# Patient Record
Sex: MALE | Race: WHITE | NOT HISPANIC OR LATINO | Employment: FULL TIME | URBAN - METROPOLITAN AREA
[De-identification: names, ages, dates, MRNs, and addresses within clinical notes are randomized per-mention and may not be internally consistent; named-entity substitution may affect disease eponyms.]

---

## 2017-05-09 ENCOUNTER — OFFICE VISIT - RIVER FALLS (OUTPATIENT)
Dept: FAMILY MEDICINE | Facility: CLINIC | Age: 47
End: 2017-05-09

## 2017-05-09 ASSESSMENT — MIFFLIN-ST. JEOR: SCORE: 2103.64

## 2017-09-06 ENCOUNTER — OFFICE VISIT - RIVER FALLS (OUTPATIENT)
Dept: FAMILY MEDICINE | Facility: CLINIC | Age: 47
End: 2017-09-06

## 2017-09-06 ASSESSMENT — MIFFLIN-ST. JEOR: SCORE: 2096.38

## 2017-09-07 LAB
CHOLEST SERPL-MCNC: 216 MG/DL
CHOLEST/HDLC SERPL: 5.7 {RATIO}
CREAT SERPL-MCNC: 1.02 MG/DL (ref 0.6–1.35)
GLUCOSE BLD-MCNC: 111 MG/DL (ref 65–99)
HBA1C MFR BLD: 5.9 %
HDLC SERPL-MCNC: 38 MG/DL
LDLC SERPL CALC-MCNC: 133 MG/DL
NONHDLC SERPL-MCNC: 178 MG/DL
TRIGL SERPL-MCNC: 315 MG/DL

## 2018-03-08 ENCOUNTER — OFFICE VISIT - RIVER FALLS (OUTPATIENT)
Dept: FAMILY MEDICINE | Facility: CLINIC | Age: 48
End: 2018-03-08

## 2018-03-08 ASSESSMENT — MIFFLIN-ST. JEOR: SCORE: 2103.64

## 2018-10-05 ENCOUNTER — OFFICE VISIT - RIVER FALLS (OUTPATIENT)
Dept: FAMILY MEDICINE | Facility: CLINIC | Age: 48
End: 2018-10-05

## 2018-10-05 ASSESSMENT — MIFFLIN-ST. JEOR: SCORE: 2009.29

## 2018-10-06 LAB
CHOLEST SERPL-MCNC: 243 MG/DL
CHOLEST/HDLC SERPL: 8.7 {RATIO}
CREAT SERPL-MCNC: 0.96 MG/DL (ref 0.6–1.35)
GLUCOSE BLD-MCNC: 104 MG/DL (ref 65–99)
HBA1C MFR BLD: 5.6 %
HDLC SERPL-MCNC: 28 MG/DL
LDLC SERPL CALC-MCNC: 141 MG/DL
LDLC SERPL CALC-MCNC: ABNORMAL MG/DL
NONHDLC SERPL-MCNC: 215 MG/DL
TRIGL SERPL-MCNC: 590 MG/DL

## 2018-11-06 ENCOUNTER — OFFICE VISIT - RIVER FALLS (OUTPATIENT)
Dept: FAMILY MEDICINE | Facility: CLINIC | Age: 48
End: 2018-11-06

## 2020-01-08 ENCOUNTER — OFFICE VISIT - RIVER FALLS (OUTPATIENT)
Dept: FAMILY MEDICINE | Facility: CLINIC | Age: 50
End: 2020-01-08

## 2020-01-08 ASSESSMENT — MIFFLIN-ST. JEOR: SCORE: 2008.38

## 2020-01-09 ENCOUNTER — COMMUNICATION - RIVER FALLS (OUTPATIENT)
Dept: FAMILY MEDICINE | Facility: CLINIC | Age: 50
End: 2020-01-09

## 2020-01-09 LAB
BUN SERPL-MCNC: 16 MG/DL (ref 7–25)
BUN/CREAT RATIO - HISTORICAL: NORMAL (ref 6–22)
CALCIUM SERPL-MCNC: 10.2 MG/DL (ref 8.6–10.3)
CHLORIDE BLD-SCNC: 102 MMOL/L (ref 98–110)
CHOLEST SERPL-MCNC: 242 MG/DL
CHOLEST/HDLC SERPL: 7.1 {RATIO}
CO2 SERPL-SCNC: 26 MMOL/L (ref 20–32)
CREAT SERPL-MCNC: 1.12 MG/DL (ref 0.7–1.33)
EGFRCR SERPLBLD CKD-EPI 2021: 76 ML/MIN/1.73M2
GLUCOSE BLD-MCNC: 89 MG/DL (ref 65–139)
HBA1C MFR BLD: 5.9 %
HDLC SERPL-MCNC: 34 MG/DL
LDLC SERPL CALC-MCNC: ABNORMAL MG/DL
NONHDLC SERPL-MCNC: 208 MG/DL
POTASSIUM BLD-SCNC: 4.2 MMOL/L (ref 3.5–5.3)
SODIUM SERPL-SCNC: 137 MMOL/L (ref 135–146)
TRIGL SERPL-MCNC: 465 MG/DL

## 2020-01-20 ENCOUNTER — COMMUNICATION - RIVER FALLS (OUTPATIENT)
Dept: FAMILY MEDICINE | Facility: CLINIC | Age: 50
End: 2020-01-20

## 2020-01-20 LAB — COLOGUARD-ABSTRACT: POSITIVE

## 2020-02-25 ENCOUNTER — OFFICE VISIT - RIVER FALLS (OUTPATIENT)
Dept: FAMILY MEDICINE | Facility: CLINIC | Age: 50
End: 2020-02-25

## 2020-10-20 ENCOUNTER — OFFICE VISIT - RIVER FALLS (OUTPATIENT)
Dept: FAMILY MEDICINE | Facility: CLINIC | Age: 50
End: 2020-10-20

## 2020-10-20 ASSESSMENT — MIFFLIN-ST. JEOR: SCORE: 2020.17

## 2020-11-06 ENCOUNTER — AMBULATORY - RIVER FALLS (OUTPATIENT)
Dept: FAMILY MEDICINE | Facility: CLINIC | Age: 50
End: 2020-11-06

## 2020-11-07 LAB
BUN SERPL-MCNC: 14 MG/DL (ref 7–25)
BUN/CREAT RATIO - HISTORICAL: ABNORMAL (ref 6–22)
CALCIUM SERPL-MCNC: 10.5 MG/DL (ref 8.6–10.3)
CHLORIDE BLD-SCNC: 107 MMOL/L (ref 98–110)
CHOLEST SERPL-MCNC: 160 MG/DL
CHOLEST/HDLC SERPL: 4.2 {RATIO}
CO2 SERPL-SCNC: 22 MMOL/L (ref 20–32)
CREAT SERPL-MCNC: 1.08 MG/DL (ref 0.7–1.33)
EGFRCR SERPLBLD CKD-EPI 2021: 80 ML/MIN/1.73M2
GLUCOSE BLD-MCNC: 101 MG/DL (ref 65–99)
HBA1C MFR BLD: 5.9 %
HDLC SERPL-MCNC: 38 MG/DL
LDLC SERPL CALC-MCNC: 97 MG/DL
NONHDLC SERPL-MCNC: 122 MG/DL
POTASSIUM BLD-SCNC: 4.5 MMOL/L (ref 3.5–5.3)
SODIUM SERPL-SCNC: 138 MMOL/L (ref 135–146)
TRIGL SERPL-MCNC: 148 MG/DL

## 2020-11-08 ENCOUNTER — COMMUNICATION - RIVER FALLS (OUTPATIENT)
Dept: FAMILY MEDICINE | Facility: CLINIC | Age: 50
End: 2020-11-08

## 2020-11-17 ENCOUNTER — OFFICE VISIT - RIVER FALLS (OUTPATIENT)
Dept: FAMILY MEDICINE | Facility: CLINIC | Age: 50
End: 2020-11-17

## 2020-12-01 ENCOUNTER — OFFICE VISIT - RIVER FALLS (OUTPATIENT)
Dept: FAMILY MEDICINE | Facility: CLINIC | Age: 50
End: 2020-12-01

## 2021-10-28 ENCOUNTER — OFFICE VISIT - RIVER FALLS (OUTPATIENT)
Dept: FAMILY MEDICINE | Facility: CLINIC | Age: 51
End: 2021-10-28

## 2021-10-28 ASSESSMENT — MIFFLIN-ST. JEOR: SCORE: 1967.56

## 2021-10-29 LAB
BUN SERPL-MCNC: 14 MG/DL (ref 7–25)
BUN/CREAT RATIO - HISTORICAL: NORMAL (ref 6–22)
CALCIUM SERPL-MCNC: 10 MG/DL (ref 8.6–10.3)
CHLORIDE BLD-SCNC: 104 MMOL/L (ref 98–110)
CHOLEST SERPL-MCNC: 133 MG/DL
CHOLEST/HDLC SERPL: 3.2 {RATIO}
CO2 SERPL-SCNC: 27 MMOL/L (ref 20–32)
CREAT SERPL-MCNC: 1.05 MG/DL (ref 0.7–1.33)
EGFRCR SERPLBLD CKD-EPI 2021: 82 ML/MIN/1.73M2
GLUCOSE BLD-MCNC: 79 MG/DL (ref 65–99)
HBA1C MFR BLD: 5.9 %
HDLC SERPL-MCNC: 41 MG/DL
LDLC SERPL CALC-MCNC: 68 MG/DL
NONHDLC SERPL-MCNC: 92 MG/DL
POTASSIUM BLD-SCNC: 4.3 MMOL/L (ref 3.5–5.3)
SODIUM SERPL-SCNC: 140 MMOL/L (ref 135–146)
TRIGL SERPL-MCNC: 160 MG/DL

## 2021-10-30 ENCOUNTER — COMMUNICATION - RIVER FALLS (OUTPATIENT)
Dept: FAMILY MEDICINE | Facility: CLINIC | Age: 51
End: 2021-10-30

## 2022-02-12 VITALS
SYSTOLIC BLOOD PRESSURE: 136 MMHG | HEART RATE: 68 BPM | TEMPERATURE: 97.8 F | DIASTOLIC BLOOD PRESSURE: 80 MMHG | WEIGHT: 254 LBS | RESPIRATION RATE: 16 BRPM | BODY MASS INDEX: 30.93 KG/M2 | OXYGEN SATURATION: 97 % | HEIGHT: 76 IN

## 2022-02-12 VITALS
BODY MASS INDEX: 28.69 KG/M2 | SYSTOLIC BLOOD PRESSURE: 138 MMHG | DIASTOLIC BLOOD PRESSURE: 68 MMHG | WEIGHT: 235.6 LBS | OXYGEN SATURATION: 97 % | SYSTOLIC BLOOD PRESSURE: 107 MMHG | HEART RATE: 54 BPM | DIASTOLIC BLOOD PRESSURE: 82 MMHG | HEIGHT: 76 IN | HEART RATE: 67 BPM | TEMPERATURE: 97.4 F

## 2022-02-12 VITALS
HEART RATE: 54 BPM | DIASTOLIC BLOOD PRESSURE: 67 MMHG | BODY MASS INDEX: 27.28 KG/M2 | HEIGHT: 76 IN | WEIGHT: 224 LBS | TEMPERATURE: 97.7 F | SYSTOLIC BLOOD PRESSURE: 118 MMHG

## 2022-02-12 VITALS
SYSTOLIC BLOOD PRESSURE: 137 MMHG | HEART RATE: 69 BPM | WEIGHT: 254 LBS | BODY MASS INDEX: 30.93 KG/M2 | HEIGHT: 76 IN | DIASTOLIC BLOOD PRESSURE: 85 MMHG

## 2022-02-12 VITALS
HEIGHT: 76 IN | SYSTOLIC BLOOD PRESSURE: 126 MMHG | TEMPERATURE: 97.7 F | DIASTOLIC BLOOD PRESSURE: 80 MMHG | HEART RATE: 64 BPM | WEIGHT: 252.4 LBS | BODY MASS INDEX: 30.74 KG/M2

## 2022-02-12 VITALS
SYSTOLIC BLOOD PRESSURE: 126 MMHG | TEMPERATURE: 97.4 F | WEIGHT: 233 LBS | DIASTOLIC BLOOD PRESSURE: 83 MMHG | HEART RATE: 71 BPM | HEIGHT: 76 IN | BODY MASS INDEX: 28.37 KG/M2

## 2022-02-12 VITALS
SYSTOLIC BLOOD PRESSURE: 96 MMHG | DIASTOLIC BLOOD PRESSURE: 62 MMHG | WEIGHT: 233.2 LBS | HEART RATE: 71 BPM | HEART RATE: 47 BPM | SYSTOLIC BLOOD PRESSURE: 120 MMHG | DIASTOLIC BLOOD PRESSURE: 70 MMHG | OXYGEN SATURATION: 97 % | TEMPERATURE: 98.3 F | HEIGHT: 76 IN | BODY MASS INDEX: 28.4 KG/M2 | TEMPERATURE: 97.1 F

## 2022-02-16 NOTE — NURSING NOTE
CAGE Assessment Entered On:  10/28/2021 3:58 PM CDT    Performed On:  10/28/2021 3:58 PM CDT by Yas Arce MA               Assessment   Have you ever felt you should cut down on your drinking :   No   Have people annoyed you by criticizing your drinking :   No   Have you ever felt bad or guilty about your drinking :   No   Have you ever taken a drink first thing in the morning to steady your nerves or get rid of a hangover (Eye-opener) :   No   CAGE Score :   0    Yas Arce MA - 10/28/2021 3:58 PM CDT

## 2022-02-16 NOTE — TELEPHONE ENCOUNTER
Entered by Cecile Ramos LPN on November 04, 2019 7:03:45 PM CST  ---------------------  From: Cecile Ramos LPN   To: [facility] #[id number]    Sent: 11/4/2019 7:03:45 PM CST  Subject: Medication Management     ** Submitted: **  Order:metoprolol (Metoprolol Tartrate 25 mg oral tablet)  1 tab(s)  Oral  bid  Qty:  60 tab(s)        Refills:  0          Substitutions Allowed     Route To [facility] - [facility] #[id number]    Signed by Cecile Ramos LPN  11/4/2019 7:03:00 PM    ** Submitted: **  Complete:metoprolol (Metoprolol Tartrate 25 mg oral tablet)   Signed by Cecile Ramos LPN  11/4/2019 7:03:00 PM    ** Not Approved:  **  metoprolol (METOPROLOL TARTRATE 25MG TABLETS)  TAKE 1 TABLET BY MOUTH TWICE DAILY  Qty:  180 tab(s)        Days Supply:  90        Refills:  0          Substitutions Allowed     Route To [facility] #[id number]   Signed by Cecile Ramos LPN          Med Refill      Date of last office visit and reason:  11/6/18 Atrial flutter      Date of last Med Check / Px:   10/5/18  Date of last labs pertaining to med:  10/5/18    RTC order in chart:  yes- reminder letter went out 10/8/19    For Protocol refill, has patient been contacted:  30 day supply sent, message sent to pharmacy        ------------------------------------------  From: [facility] #[id number]  To: Akin Castillo MD  Sent: November 4, 2019 1:22:25 PM CST  Subject: Medication Management  Due: November 5, 2019 1:22:25 PM CST    ** On Hold Pending Signature **  Drug: metoprolol (Metoprolol Tartrate 25 mg oral tablet)  1 TAB(S) ORAL BID  Quantity: 180 tab(s)  Days Supply: 0  Refills: 0  Substitutions Allowed  Notes from Pharmacy:     Dispensed Drug: metoprolol (Metoprolol Tartrate 25 mg oral tablet)  TAKE 1 TABLET BY MOUTH TWICE DAILY  Quantity: 180 tab(s)  Days Supply: 90  Refills: 0  Substitutions Allowed  Notes from Pharmacy:   ------------------------------------------

## 2022-02-16 NOTE — LETTER
(Inserted Image. Unable to display)   [address] [place]  [address]  October 30, 2021                      TOM DRIVER      [address] [place] [address]        Dear TOM,    Thank you for selecting [facility] for your health care needs.  Below you will find the results of your recent test(s) done at our clinic.     Your hemoglobin A1c is still a bit elevated.  It is in the prediabetic range.  Continue with a healthy diet, regular activity and maintain your current weight.      Result Name Current Result Previous Result Reference Range   Glucose Level (mg/dL)  79 10/28/2021 ((H)) 101 11/6/2020 65 - 99   BUN (mg/dL)  14 10/28/2021  14 11/6/2020 7 - 25   Creatinine Level (mg/dL)  1.05 10/28/2021  1.08 11/6/2020 0.70 - 1.33   eGFR (mL/min/1.73m2)  82 10/28/2021  80 11/6/2020 > OR = 60 -    eGFR  (mL/min/1.73m2)  95 10/28/2021  92 11/6/2020 > OR = 60 -    BUN/Creat Ratio  NOT APPLICABLE 10/28/2021  NOT APPLICABLE 11/6/2020 6 - 22   Sodium Level (mmol/L)  140 10/28/2021  138 11/6/2020 135 - 146   Potassium Level (mmol/L)  4.3 10/28/2021  4.5 11/6/2020 3.5 - 5.3   Chloride Level (mmol/L)  104 10/28/2021  107 11/6/2020 98 - 110   CO2 Level (mmol/L)  27 10/28/2021  22 11/6/2020 20 - 32   Calcium Level (mg/dL)  10.0 10/28/2021 ((H)) 10.5 11/6/2020 8.6 - 10.3   Cholesterol (mg/dL)  133 10/28/2021  160 11/6/2020  - <200   HDL (mg/dL)  41 10/28/2021 ((L)) 38 11/6/2020 > OR = 40 -    Triglyceride (mg/dL) ((H)) 160 10/28/2021  148 11/6/2020  - <150   LDL  68 10/28/2021  97 11/6/2020    Cholesterol/HDL Ratio  3.2 10/28/2021  4.2 11/6/2020  - <5.0   Non-HDL Cholesterol  92 10/28/2021  122 11/6/2020  - <130   Hgb A1c ((H)) 5.9 10/28/2021 ((H)) 5.9 11/6/2020  - <5.7       Please contact me or my assistant at [phone] if you have any questions about your results.    Sincerely,        Gage Quintana MD        What do your labs mean?  Below is a  glossary of commonly ordered labs:  LDL   Bad Cholesterol   HDL   Good Cholesterol  AST/ALT   Liver Function   Cr/Creatinine   Kidney Function  Microalbumin   Kidney Function  BUN   Kidney Function  PSA   Prostate    TSH   Thyroid Hormone  HgbA1c   Diabetes Test   Hgb (Hemoglobin)   Red Blood Cells

## 2022-02-16 NOTE — TELEPHONE ENCOUNTER
---------------------  From: Misty Cook   Sent: 11/10/2020 3:26:38 PM CST  Subject: Result: Esophagram     Spoke with patient at 3:25pm regarding recent Esophagram.  Per TFS esophagram shows changes from the fundoplication.  The endoscopy will get a better look at the area and will show any inflammation. Patient agrees and has no questions.  He is set up for the endoscopy with [facility].

## 2022-02-16 NOTE — TELEPHONE ENCOUNTER
---------------------  From: Ayala Cannon CMA   To: [facility] ([id number]_[place] [facility] [place]);     Sent: 1/20/2020 1:13:26 PM CST  Subject: Phone Message     Phone Message    PCP:   ANDREEA      Time of Call:  1305       Person Calling:  Angie from [employer]  Phone number:  [phone]    Note:   Angie called to make sure Dr Castillo saw the abnormal Cologuard result. Result scanned in patient chart.    Last office visit and reason:  01- well adult exam- male with TFSscanned in on 1/14/20---------------------  From: Tiffany Lundy CMA ([facility] ([id number]))   To: Akin Castillo MD;     Sent: 1/20/2020 2:12:07 PM CST  Subject: FW: Phone Message?   ?   ---------------------  From: Akin Castillo  To: Tiffany Lundy CMA ([facility] ([id number]))  Sent: January 20, 2020 4:47 PM CST  Subject: RE: Phone Message  ???  Let him know positive test so needs colonoscopy?Patient notified, colonoscopy order completed and given to referrals.

## 2022-02-16 NOTE — TELEPHONE ENCOUNTER
Order for repeat colonoscopy faxed to [facility] to schedule for end of year or early 2021.

## 2022-02-16 NOTE — LETTER
(Inserted Image. Unable to display)   November 08, 2020        TOM DRIVER      [address], [place] [id number]        Dear TOM,    Thank you for selecting [facility] for your health care needs.  Below you will find the results of your recent test(s) done at our clinic.     Your tests look good.  The HgbA1c is in the prediabetic range.  Weight management and diet changes are the best ways to control this.      Result Name Current Result Previous Result Reference Range   Sodium Level (mmol/L)  138 11/6/2020  137 1/8/2020 135 - 146   Potassium Level (mmol/L)  4.5 11/6/2020  4.2 1/8/2020 3.5 - 5.3   Chloride Level (mmol/L)  107 11/6/2020  102 1/8/2020 98 - 110   CO2 Level (mmol/L)  22 11/6/2020  26 1/8/2020 20 - 32   Glucose Level (mg/dL) ((H)) 101 11/6/2020  89 1/8/2020 65 - 99   BUN (mg/dL)  14 11/6/2020  16 1/8/2020 7 - 25   Creatinine Level (mg/dL)  1.08 11/6/2020  1.12 1/8/2020 0.70 - 1.33   eGFR (mL/min/1.73m2)  80 11/6/2020  76 1/8/2020 > OR = 60 -    Calcium Level (mg/dL) ((H)) 10.5 11/6/2020  10.2 1/8/2020 8.6 - 10.3   Hgb A1c ((H)) 5.9 11/6/2020 ((H)) 5.9 1/8/2020  - <5.7   Cholesterol (mg/dL)  160 11/6/2020 ((H)) 242 1/8/2020  - <200   Non-HDL Cholesterol  122 11/6/2020 ((H)) 208 1/8/2020  - <130   HDL (mg/dL) ((L)) 38 11/6/2020 ((L)) 34 1/8/2020 > OR = 40 -    Cholesterol/HDL Ratio  4.2 11/6/2020 ((H)) 7.1 1/8/2020  - <5.0   LDL  97 11/6/2020  See comment 1/8/2020    Triglyceride (mg/dL)  148 11/6/2020 ((H)) 465 1/8/2020  - <150       Please contact me or my assistant at [phone] if you have any questions about your results.    Sincerely,        Gage Quintana MD        What do your labs mean?  Below is a glossary of commonly ordered labs:  LDL   Bad Cholesterol   HDL   Good Cholesterol  AST/ALT   Liver Function   Cr/Creatinine   Kidney Function  Microalbumin   Kidney Function  BUN   Kidney Function  PSA   Prostate    TSH   Thyroid Hormone  HgbA1c   Diabetes Test    Hgb (Hemoglobin)   Red Blood Cells

## 2022-02-16 NOTE — PROGRESS NOTES
Patient:   TOM DRIVER            MRN: [id number]            FIN: [id number]               Age:   47 years     Sex:  Male     :  1970   Associated Diagnoses:   Well adult; Benign essential HTN; Mixed hyperlipidemia; Pre-diabetes; Psoriasis   Author:   Akin Castillo MD      Visit Information      Date of Service: 10/05/2018 07:58 am  Performing Location: [facility]  Encounter#: [id number]      Primary Care Provider (PCP):  Zoran Quintana MD    NPI# [id number]      Referring Provider:  Akin Castillo MD    NPI# [id number]   Visit type:  Annual exam.    Accompanied by:  No one.    Source of history:  Self, Medical record.    History limitation:  None.       Chief Complaint   10/5/2018 8:01 AM CDT    Px        Well Adult History   Well Adult History             The patient presents for well adult exam.  The patient's general health status is described as good.  The patient's diet is described as balanced and gluten free.  Exercise: occasional.  Associated symptoms consist of none.  Additional pertinent history: daily caffeine use, tobacco use none and occasional alcohol use.       colonoscopy:  n/a  lipid and diabetes screening:  due  prostate cancer screening:  n/a  heart disease risk:  moderate  immunizations:   influenza, obtained at work  other:  Doing well with metoprolol, BP under control,   2 kids (20,17)  RN Surgical sup. at [employer]      Review of Systems   Constitutional:  No fever, No chills, No sweats, No weakness, No fatigue.    Eye:  No recent visual problem.    Ear/Nose/Mouth/Throat:  No decreased hearing, No nasal congestion, No sore throat.    Respiratory:  No shortness of breath, No cough.    Cardiovascular:  Negative, No chest pain, No palpitations, No peripheral edema.    Gastrointestinal:  No nausea, No vomiting, No diarrhea, No constipation, No heartburn.    Genitourinary:  No dysuria, No change in urine stream.    Hematology/Lymphatics:  No bruising  tendency, No bleeding tendency.    Endocrine:  No cold intolerance, No heat intolerance.    Immunologic:  Negative.    Musculoskeletal:  Muscle pain, No back pain, No neck pain, No joint pain.    Integumentary:  No rash, No dryness, No skin lesion.    Neurologic:  Alert and oriented X4, No headache.    Psychiatric:  No anxiety, No depression.      PHQ9 and CAGE reviewed and discussed with patient.       Health Status   Allergies:    Allergic Reactions (Selected)  No Known Medication Allergies   Medications:  (Selected)   Documented Medications  Documented  Otezla 30 mg oral tablet: = 1 tab(s) ( 30 mg ), Oral, bid, Instructions: Rx'd by Dermatology, 0 Refill(s), Type: Maintenance,    Medications          *denotes recorded medication          *Otezla 30 mg oral tablet: 30 mg, 1 tab(s), Oral, bid, Rx'd by Dermatology, 0 Refill(s).     Problem list:    All Problems  Achalasia, esophageal / SNOMED CT 70958876 / Confirmed  Benign essential HTN / SNOMED CT 1209226 / Confirmed  Migraine headache / SNOMED CT 63719946 / Confirmed  Mixed hyperlipidemia / SNOMED CT 585362280 / Confirmed  Obesity / SNOMED CT 5106433762 / Probable  Pre-diabetes / SNOMED CT 4938654287 / Confirmed  Tobacco abuse / ICD-9-.1 / Confirmed      Histories   Past Medical History:    Active  Obesity (1556166250)  Tobacco abuse (305.1)  Migraine headache (74803996)  Achalasia, esophageal (81715578)   Family History:    Diabetes mellitus  Sister (Kelly)     Procedure history:    Vasectomy (SNOMED CT 92924829) performed by Zoran Quintana MD on 12/17/2009 at 39 Years.  Upper endoscopy with balloon dilation of esophagus and distal esophageal biopsies performed by Richard Jesus MD on 12/15/2009 at 39 Years.  heller myotomy w/ Zaki in 1986 at 16 Years.   Social History:        Alcohol Assessment            Current, 1-2 times per week, 3 drinks/episode average.      Tobacco Assessment            Past, Cigarettes, 19 year(s).      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      Home and Environment Assessment            Marital status: .  Spouse/Partner name: Spouse:   Xiomara.  Lives with Spouse.      Exercise and Physical Activity Assessment            Exercise frequency: 3-4 times/week.  Exercise type: Bicycling, Running, Walking.      Sexual Assessment            Sexually active: Yes.  Sexual orientation: Heterosexual.  Other contraceptive use: Hx of Vasectomy.        Physical Examination   Vital Signs   10/5/2018 8:01 AM CDT Temperature Tympanic 97.1 DegF  LOW    Peripheral Pulse Rate 71 bpm    HR Method Electronic    Systolic Blood Pressure 96 mmHg    Diastolic Blood Pressure 62 mmHg    Mean Arterial Pressure 73 mmHg    BP Method Electronic      Measurements from flowsheet : Measurements   10/5/2018 8:01 AM CDT Height Measured - Standard 76 in    Weight Measured - Standard 233.2 lb    BSA 2.38 m2    Body Mass Index 28.38 kg/m2  HI      General:  Alert and oriented, No acute distress.    Eye:  Pupils are equal, round and reactive to light, Extraocular movements are intact, Normal conjunctiva.    HENT:  Normocephalic, Tympanic membranes are clear, Oral mucosa is moist, No pharyngeal erythema, No sinus tenderness, posterior pharyngeal drainage.    Neck:  Supple, Non-tender, No carotid bruit, No lymphadenopathy, No thyromegaly.    Respiratory:  Lungs are clear to auscultation, Respirations are non-labored, Breath sounds are equal.         Chest wall: Wall tenderness ( Right, Anterior, Posterior, Mid-axillary ).    Cardiovascular:  Normal rate, Regular rhythm, No murmur, No gallop, Good pulses equal in all extremities, Normal peripheral perfusion, No edema.    Gastrointestinal:  Soft, Non-tender, Non-distended, Normal bowel sounds, No organomegaly.    Genitourinary:  No costovertebral angle tenderness.    Lymphatics:  WNL.     Musculoskeletal:  Normal range of motion, Normal strength, No tenderness, No swelling, No deformity.    Integumentary:  Warm, Dry, psoriasis.    Neurologic:  Alert, Oriented, Normal sensory, Normal motor function, No focal deficits.    Psychiatric:  Cooperative, Appropriate mood & affect.       Impression and Plan   Diagnosis     Well adult (NBN94-EL Z00.00).     Benign essential HTN (IKY84-AE I10).     Mixed hyperlipidemia (UBJ09-OD E78.2).     Pre-diabetes (ZOS43-ZH R73.03).     Psoriasis (INA36-QP L40.9).     Course:  Progressing as expected.    Plan:  Discussed health maintenance, diet, exercise, BMI discussed, labs today, continue current medication, tramadol for chest wall pain, Wt loss form psoriasis med   BP much better  dc metoprolol.    Orders     Orders (Selected)   Outpatient Orders  Ordered  Return to Clinic (Request): RFV: Yearly px with TFS, Return in 1 year  Ordered (In Transit)  Basic Metabolic Panel* ([facility]): Specimen Type: Serum, Collection Date: 10/05/18 7:01:00 CDT  Hemoglobin A1c* ([facility]): Specimen Type: Blood, Collection Date: 10/05/18 7:01:00 CDT  Lipid panel with reflex to direct ldl* ([facility]): Specimen Type: Serum, Collection Date: 10/05/18 7:01:00 CDT  Documented Medications  Documented  Otezla 30 mg oral tablet: = 1 tab(s) ( 30 mg ), Oral, bid, Instructions: Rx'd by Dermatology, 0 Refill(s), Type: Maintenance.

## 2022-02-16 NOTE — NURSING NOTE
Depression Screening Entered On:  10/20/2020 5:59 PM CDT    Performed On:  10/20/2020 5:59 PM CDT by Yas Arce MA               Depression Screening   Little Interest - Pleasure in Activities :   Not at all   Feeling Down, Depressed, Hopeless :   Not at all   Initial Depression Screen Score :   0 Score   Poor Appetite or Overeating :   Not at all   Trouble Falling or Staying Asleep :   Not at all   Feeling Tired or Little Energy :   Not at all   Feeling Bad About Yourself :   Not at all   Trouble Concentrating :   Not at all   Moving or Speaking Slowly :   Not at all   Thoughts Better Off Dead or Hurting Self :   Not at all   Detailed Depression Screen Score :   0    Total Depression Screen Score :   0    Yas Arce MA - 10/20/2020 5:59 PM CDT

## 2022-02-16 NOTE — NURSING NOTE
CAGE Assessment Entered On:  10/20/2020 5:59 PM CDT    Performed On:  10/20/2020 5:59 PM CDT by Yas Arce MA               Assessment   Have you ever felt you should cut down on your drinking :   No   Have people annoyed you by criticizing your drinking :   No   Have you ever felt bad or guilty about your drinking :   No   Have you ever taken a drink first thing in the morning to steady your nerves or get rid of a hangover (Eye-opener) :   No   CAGE Score :   0    Yas Arce MA - 10/20/2020 5:59 PM CDT

## 2022-02-16 NOTE — TELEPHONE ENCOUNTER
---------------------  From: Dodie Short   To: Lilian HALL, Ashley;     Sent: 12/1/2020 3:44:08 PM CST  Subject: Hearing Evaluation Results     Dr. Quintana,    I had the pleasure of seeing your patient for a hearing evaluation and his detailed results are listed below.    Hx: Patient reports bilateral tinnitus with decreased hearing. He has difficulty with conversation in noisy listening environments. Noise exposure from [employer] work. Denied pain, pressure, drainage, dizziness and family history of hearing loss.    Results: Otoscopy: clear canals bilaterally. Mild to moderate sensorineural hearing loss (SNHL) bilaterally.  Word Recognition Score: 88% left, 84% right    Rec: Bilateral hearing aid candidate. Annual hearing test or sooner if concerns arise.    Thank you for your referral and please let me know if any questions arise,    Sarah Aceves, CCC-A

## 2022-02-16 NOTE — PROGRESS NOTES
Patient:   TOM DRIVER            MRN: [id number]            FIN: [id number]               Age:   49 years     Sex:  Male     :  1970   Associated Diagnoses:   Well adult; Benign essential HTN; Mixed hyperlipidemia; Pre-diabetes; Psoriasis   Author:   Akin Castillo MD      Visit Information      Date of Service: 2020 04:11 pm  Performing Location: [facility]  Encounter#: [id number]      Primary Care Provider (PCP):  Zoran Quintana MD    NPI# [id number]      Referring Provider:  Akin Castillo MD    NPI# [id number]   Visit type:  Annual exam.    Accompanied by:  No one.    Source of history:  Self, Medical record.    History limitation:  None.       Chief Complaint   2020 4:14 PM CST     Px        Well Adult History   Well Adult History             The patient presents for well adult exam.  The patient's general health status is described as good.  The patient's diet is described as balanced and gluten free.  Exercise: occasional.  Associated symptoms consist of none.  Additional pertinent history: daily caffeine use, tobacco use none and occasional alcohol use.     psoriasis sees derm  No atrial flutter   2 kids (21,18)  RN Surgical sup. at [employer]      Review of Systems   Constitutional:  No fever, No chills, No sweats, No weakness, No fatigue.    Eye:  No recent visual problem.    Ear/Nose/Mouth/Throat:  No decreased hearing, No nasal congestion, No sore throat.    Respiratory:  No shortness of breath, No cough.    Cardiovascular:  Negative, No chest pain, No palpitations, No peripheral edema.    Gastrointestinal:  No nausea, No vomiting, No diarrhea, No constipation, No heartburn.    Genitourinary:  No dysuria, No change in urine stream.    Hematology/Lymphatics:  No bruising tendency, No bleeding tendency.    Endocrine:  No cold intolerance, No heat intolerance.    Immunologic:  Negative.    Musculoskeletal:  Muscle pain, No back pain, No neck pain, No  joint pain.    Integumentary:  No rash, No dryness, No skin lesion.    Neurologic:  Alert and oriented X4, No headache.    Psychiatric:  No anxiety, No depression.      PHQ9 and CAGE reviewed and discussed with patient.       Health Status   Allergies:    Allergic Reactions (Selected)  No Known Medication Allergies   Medications:  (Selected)   Prescriptions  Prescribed  Metoprolol Tartrate 25 mg oral tablet: = 1 tab(s), Oral, bid, # 60 tab(s), 0 Refill(s), Type: Maintenance, Pharmacy: [facility] #[id number], Needs appt for further refills  Documented Medications  Documented  Otezla 30 mg oral tablet: = 1 tab(s) ( 30 mg ), Oral, bid, Instructions: Rx'd by Dermatology, 0 Refill(s), Type: Maintenance,    Medications          *denotes recorded medication          *Otezla 30 mg oral tablet: 30 mg, 1 tab(s), Oral, bid, Rx'd by Dermatology, 0 Refill(s).          Metoprolol Tartrate 25 mg oral tablet: 1 tab(s), Oral, bid, 60 tab(s), 0 Refill(s).       Problem list:    All Problems  Achalasia, esophageal / SNOMED CT 90755078 / Confirmed  Benign essential HTN / SNOMED CT 9470546 / Confirmed  H/O vasectomy / SNOMED CT 126480165 / Confirmed  Migraine headache / SNOMED CT 24720361 / Confirmed  Mixed hyperlipidemia / SNOMED CT 808063091 / Confirmed  Obesity / SNOMED CT 1029982329 / Probable  Pre-diabetes / SNOMED CT 4773546366 / Confirmed  Tobacco abuse / ICD-9-.1 / Confirmed      Histories   Past Medical History:    Active  Obesity (9870962427)  Tobacco abuse (305.1)  Migraine headache (70249218)  Achalasia, esophageal (27171043)   Family History:    Diabetes mellitus  Sister (Kelly)     Procedure history:    Vasectomy (SNOMED CT 53130047) performed by Zoran Quintana MD on 12/17/2009 at 39 Years.  Upper endoscopy with balloon dilation of esophagus and distal esophageal biopsies performed by Richard Jesus MD on 12/15/2009 at 39 Years.  heller myotomy w/ Zaki in 1986 at 16 Years.   Social History:        Alcohol  Assessment            Current, 1-2 times per week, 3 drinks/episode average.      Tobacco Assessment            Past, Cigarettes, 19 year(s).      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      Home and Environment Assessment            Marital status: .  Spouse/Partner name: Spouse:   Xiomara.  Lives with Spouse.      Exercise and Physical Activity Assessment            Exercise frequency: 3-4 times/week.  Exercise type: Bicycling, Running, Walking.      Sexual Assessment            Sexually active: Yes.  Sexual orientation: Heterosexual.  Other contraceptive use: Hx of Vasectomy.        Physical Examination   Vital Signs   1/8/2020 4:14 PM CST Temperature Tympanic 97.4 DegF  LOW    Peripheral Pulse Rate 71 bpm    HR Method Electronic    Systolic Blood Pressure 126 mmHg    Diastolic Blood Pressure 83 mmHg  HI    Mean Arterial Pressure 97 mmHg    BP Method Electronic      Measurements from flowsheet : Measurements   1/8/2020 4:14 PM CST Height Measured - Standard 76 in    Weight Measured - Standard 233.0 lb    BSA 2.38 m2    Body Mass Index 28.36 kg/m2  HI      General:  Alert and oriented, No acute distress.    Eye:  Pupils are equal, round and reactive to light, Extraocular movements are intact, Normal conjunctiva.    HENT:  Normocephalic, Tympanic membranes are clear, Oral mucosa is moist, No pharyngeal erythema, No sinus tenderness, posterior pharyngeal drainage.    Neck:  Supple, Non-tender, No carotid bruit, No lymphadenopathy, No thyromegaly.    Respiratory:  Lungs are clear to auscultation, Respirations are non-labored, Breath sounds are equal.         Chest wall: Wall tenderness ( Right, Anterior, Posterior, Mid-axillary ).    Cardiovascular:  Normal rate, Regular rhythm, No murmur, No gallop, Good pulses equal in all extremities, Normal peripheral perfusion, No edema.     Gastrointestinal:  Soft, Non-tender, Non-distended, Normal bowel sounds, No organomegaly.    Genitourinary:  No costovertebral angle tenderness.    Lymphatics:  WNL.    Musculoskeletal:  Normal range of motion, Normal strength, No tenderness, No swelling, No deformity.    Integumentary:  Warm, Dry, psoriasis  plams.    Neurologic:  Alert, Oriented, Normal sensory, Normal motor function, No focal deficits.    Psychiatric:  Cooperative, Appropriate mood & affect.       Impression and Plan   Diagnosis     Well adult (NHN39-WZ Z00.00).     Benign essential HTN (TIG77-EZ I10).     Mixed hyperlipidemia (OKQ25-OT E78.2).     Pre-diabetes (CIV09-FA R73.03).     Psoriasis (VDY05-RK L40.9).     Course:  Progressing as expected.    Plan:  Discussed health maintenance, diet, exercise, BMI discussed, labs today, continue current medication,  wants to stop po psoriasis med  will trial; lachydrin  cologuard  discussed psa,   .    Patient Instructions:       Counseled: Patient, Regarding diagnosis, Regarding medications.    Orders     Orders (Selected)   Outpatient Orders  Ordered  Return to Clinic (Request): RFV: Yearly px with TFS, Return in 1 year  Ordered (Dispatched)  Basic Metabolic Panel* ([facility]): Specimen Type: Serum, Collection Date: 01/08/20 7:24:00 CST  Hemoglobin A1c* ([facility]): Specimen Type: Blood, Collection Date: 01/08/20 7:24:00 CST  Lipid panel with reflex to direct ldl* ([facility]): Specimen Type: Serum, Collection Date: 01/08/20 7:24:00 CST  Prescriptions  Prescribed  Metoprolol Tartrate 25 mg oral tablet: = 1 tab(s), Oral, bid, # 60 tab(s), 0 Refill(s), Type: Maintenance, Pharmacy: [facility] #[id number], Needs appt for further refills  Documented Medications  Documented  Otezla 30 mg oral tablet: = 1 tab(s) ( 30 mg ), Oral, bid, Instructions: Rx'd by Dermatology, 0 Refill(s), Type: Maintenance.

## 2022-02-16 NOTE — PROGRESS NOTES
Patient:   TOM DRIVER            MRN: [id number]            FIN: [id number]               Age:   47 years     Sex:  Male     :  1970   Associated Diagnoses:   None   Author:   Anna HALL, Akin      Procedure   EKG procedure   Indication: palpitations.     Position: supine.     EKG findings   Interpretation: by primary care provider.     Rhythm: sinus.     Axis: normal axis, normal configuration.     Within normal limits.     Intervals: LA normal, QRS normal, QT normal.     Normal EKG.     P waves: normal.     QRS complex: normal, no Q waves present.     ST-T-U complex: normal.     Interpretation: normal EKG, no ST-T wave abnormalities.     Discussed: with patient.        Impression and Plan   Orders

## 2022-02-16 NOTE — NURSING NOTE
Depression Screening Entered On:  10/28/2021 3:58 PM CDT    Performed On:  10/28/2021 3:58 PM CDT by Yas Arce MA               Depression Screening   Little Interest - Pleasure in Activities :   Not at all   Feeling Down, Depressed, Hopeless :   Not at all   Initial Depression Screen Score :   0 Score   Poor Appetite or Overeating :   Not at all   Trouble Falling or Staying Asleep :   Not at all   Feeling Tired or Little Energy :   Not at all   Feeling Bad About Yourself :   Not at all   Trouble Concentrating :   Not at all   Moving or Speaking Slowly :   Not at all   Thoughts Better Off Dead or Hurting Self :   Not at all   Detailed Depression Screen Score :   0    Total Depression Screen Score :   0    Yas Arce MA - 10/28/2021 3:58 PM CDT

## 2022-02-16 NOTE — TELEPHONE ENCOUNTER
Entered by Alysa Thompson on January 08, 2020 5:09:53 PM CST  done      ---------------------  From: Misty Cook   To: Appointment Pool ([id number]);     Sent: 1/8/2020 4:43:12 PM CST  Subject: General Message     please change pcp to TFS

## 2022-02-16 NOTE — NURSING NOTE
Comprehensive Intake Entered On:  1/8/2020 4:15 PM CST    Performed On:  1/8/2020 4:14 PM CST by Misty Cook               Summary   Chief Complaint :   Px   Advance Directive :   No   Weight Measured :   233.0 lb(Converted to: 233 lb 0 oz, 105.69 kg)    Height Measured :   76 in(Converted to: 6 ft 4 in, 193.04 cm)    Body Mass Index :   28.36 kg/m2 (HI)    Body Surface Area :   2.38 m2   Systolic Blood Pressure :   126 mmHg   Diastolic Blood Pressure :   83 mmHg (HI)    Mean Arterial Pressure :   97 mmHg   Peripheral Pulse Rate :   71 bpm   BP Method :   Electronic   HR Method :   Electronic   Temperature Tympanic :   97.4 DegF(Converted to: 36.3 DegC)  (LOW)    Misty Cook - 1/8/2020 4:14 PM CST   Health Status   Allergies Verified? :   Yes   Medication History Verified? :   Yes   Pre-Visit Planning Status :   Completed   Tobacco Use? :   Never smoker   Misty Cook - 1/8/2020 4:14 PM CST

## 2022-02-16 NOTE — LETTER
(Inserted Image. Unable to display)   October 22, 2020      TOM DRIVER  [address], [place] [id number]        Dear TOM,      Thank you for selecting [facility] for your healthcare needs.       Your recent chest x-ray was read as normal.          Please contact me or my assistant at [phone] if you have any questions or concerns.     Sincerely,        Zoran Quintana MD

## 2022-02-16 NOTE — LETTER
(Inserted Image. Unable to display)   October 21, 2021    TOM DRIVER  [address] [place] [address]            Dear TOM,      Thank you for selecting [facility] for your healthcare needs.    Our records indicate you are due for the following services:     Annual Physical.    (FYI   Regarding office visits: In some instances, a video visit or telephone visit may be offered as an option.)      To schedule an appointment or if you have further questions, please contact your clinic at [phone].      Powered by [employer]    Sincerely,    Akin Castillo MD

## 2022-02-16 NOTE — TELEPHONE ENCOUNTER
Entered by Nathaly Flaherty CMA on January 05, 2021 7:26:04 AM CST  ---------------------  From: Nathaly Flaherty CMA   To: [facility] #[id number]    Sent: 1/5/2021 7:26:04 AM CST  Subject: Medication Management     ** Submitted: **  Order:metoprolol (Metoprolol Tartrate 25 mg oral tablet)  1 tab(s)  Oral  bid  Qty:  180 tab(s)        Days Supply:  90        Refills:  2          Substitutions Allowed     Route To [facility] #[id number]    Signed by Nathaly Flaherty CMA  1/5/2021 1:25:00 PM [facility]    ** Submitted: **  Complete:metoprolol (Metoprolol Tartrate 25 mg oral tablet)   Signed by Nathaly Flaherty CMA  1/5/2021 1:26:00 PM [facility]    ** Not Approved:  **  metoprolol (METOPROLOL TARTRATE 25MG TABLETS)  TAKE 1 TABLET BY MOUTH TWICE DAILY  Qty:  180 tab(s)        Days Supply:  90        Refills:  0          Substitutions Allowed     Route To [facility] #[id number]   Signed by Nathaly Flaherty CMA            ------------------------------------------  From: [facility] #[id number]  To: Akin Castillo MD  Sent: January 2, 2021 12:47:21 PM CST  Subject: Medication Management  Due: December 24, 2020 1:58:33 PM CST     ** On Hold Pending Signature **     Dispensed Drug: metoprolol (Metoprolol Tartrate 25 mg oral tablet), TAKE 1 TABLET BY MOUTH TWICE DAILY  Quantity: 180 tab(s)  Days Supply: 90  Refills: 0  Substitutions Allowed  Notes from Pharmacy:  ------------------------------------------10/20/20 px, CDM  rtc 1 year

## 2022-02-16 NOTE — TELEPHONE ENCOUNTER
Entered by Yas Arce MA on October 18, 2021 9:58:33 AM CDT  ---------------------  From: Yas Arce MA   To: [facility] #[id number]    Sent: 10/18/2021 9:58:33 AM CDT  Subject: Medication Management     ** Not Approved: Patient needs appointment **  atorvastatin (ATORVASTATIN 20MG TABLETS)  TAKE 1 TABLET BY MOUTH DAILY  Qty:  90 tab(s)        Days Supply:  90        Refills:  0          Substitutions Allowed     Route To Pharmacy - [facility] #[id number]   Note from Pharmacy:  **Patient requests 90 days supply**  Signed by Yas Arce MA            ------------------------------------------  From: [facility] #[id number]  To: Zoran Quintana MD  Sent: October 12, 2021 12:49:13 PM CDT  Subject: Medication Management  Due: October 2, 2021 3:22:27 PM CDT     ** On Hold Pending Signature **     Dispensed Drug: atorvastatin (atorvastatin 20 mg oral tablet), TAKE 1 TABLET BY MOUTH DAILY  Quantity: 90 tab(s)  Days Supply: 90  Refills: 0  Substitutions Allowed  Notes from Pharmacy: **Patient requests 90 days supply**  ------------------------------------------

## 2022-02-16 NOTE — TELEPHONE ENCOUNTER
Entered by Nathaly Flaherty CMA on October 12, 2021 12:46:56 PM CDT  ---------------------  From: Nathaly Flaherty CMA   To: [facility] #[id number]    Sent: 10/12/2021 12:46:56 PM CDT  Subject: Medication Management     ** Submitted: **  Order:metoprolol (Metoprolol Tartrate 25 mg oral tablet)  1 tab(s)  Oral  bid  Qty:  60 tab(s)        Refills:  0          Substitutions Allowed     Route To [facility] #[id number]    Signed by Nathaly Flaherty CMA  10/12/2021 5:46:00 PM [facility]    ** Submitted: **  Complete:metoprolol (Metoprolol Tartrate 25 mg oral tablet)   Signed by Nathaly Flaherty CMA  10/12/2021 5:46:00 PM [facility]    ** Not Approved:  **  metoprolol (METOPROLOL TARTRATE 25MG TABLETS)  TAKE 1 TABLET BY MOUTH TWICE DAILY  Qty:  180 tab(s)        Days Supply:  90        Refills:  0          Substitutions Allowed     Route To [facility] #[id number]   Signed by Nathaly Flaherty CMA            ------------------------------------------  From: [facility] #[id number]  To: Akin Castillo MD  Sent: October 9, 2021 4:47:09 PM CDT  Subject: Medication Management  Due: October 2, 2021 3:22:27 PM CDT     ** On Hold Pending Signature **     Dispensed Drug: metoprolol (Metoprolol Tartrate 25 mg oral tablet), TAKE 1 TABLET BY MOUTH TWICE DAILY  Quantity: 180 tab(s)  Days Supply: 90  Refills: 0  Substitutions Allowed  Notes from Pharmacy:  ------------------------------------------due for px - reminder letter will go out

## 2022-02-16 NOTE — TELEPHONE ENCOUNTER
Order, notes and insurance card are faxed to [facility] and they will contact patient.

## 2022-02-16 NOTE — TELEPHONE ENCOUNTER
Entered by Yas Arce MA on December 08, 2021 9:17:50 AM CST  ---------------------  From: Yas Arce MA   To: [email or web address] #[email or web address]    Sent: 12/8/2021 9:17:50 AM CST  Subject: Medication Management     ** Not Approved: Refill not appropriate, was refilled by [facility] 10/28/2021 #90 w/ 1 refill **  metoprolol (METOPROLOL TARTRATE 25MG TABLETS)  TAKE 1 TABLET BY MOUTH TWICE DAILY  Qty:  180 tab(s)        Days Supply:  90        Refills:  0          Substitutions Allowed     Route To Pharmacy - [email or web address] #[id number]   Note from Pharmacy:  **Patient requests 90 days supply**  Signed by Yas Arce MA            ------------------------------------------  From: [email or web address] #[email or web address]  To: Akin Castillo MD  Sent: December 5, 2021 10:47:35 AM CST  Subject: Medication Management  Due: November 16, 2021 3:37:48 PM CST     ** On Hold Pending Signature **     Dispensed Drug: metoprolol (Metoprolol Tartrate 25 mg oral tablet), TAKE 1 TABLET BY MOUTH TWICE DAILY  Quantity: 180 tab(s)  Days Supply: 90  Refills: 0  Substitutions Allowed  Notes from Pharmacy: **Patient requests 90 days supply**  ------------------------------------------

## 2022-02-16 NOTE — LETTER
(Inserted Image. Unable to display)   November 20, 2020      TOM DRIVER  [address], [place] [id number]        Dear TOM,      Thank you for selecting [facility] for your healthcare needs.     You had a colonoscopy done for colon polyp surveillance on Tuesday November 17th, 2020. One precancerous polyp (tubular adenoma) and one nonprecancerous polyp (hyperplastic) were removed. I would recommend a follow up colonoscopy in 3 years and sooner if new symptoms develop.            Please contact me or my assistant at [phone] if you have any questions or concerns.     Sincerely,        Gage Pyle MD

## 2022-02-16 NOTE — TELEPHONE ENCOUNTER
Entered by Alysa Thompson on October 19, 2021 12:00:11 PM CDT  Patient will call back to schedule      ---------------------  From: Tamika Spencer CMA   To: Appointment Pool ([id number]);     Sent: 10/19/2021 8:04:31 AM CDT  Subject: annual exam due - please call pt to set up - thanks!

## 2022-02-16 NOTE — PROGRESS NOTES
Patient:   TOM DRIVER            MRN: [id number]            FIN: [id number]               Age:   46 years     Sex:  Male     :  1970   Associated Diagnoses:   Well adult; Benign essential HTN; Mixed hyperlipidemia; Pre-diabetes; Chest wall injury   Author:   Zoran Quintana MD      Visit Information      Date of Service: 2017 08:44 am  Performing Location: [facility]  Encounter#: [id number]      Primary Care Provider (PCP):  Zoran Quintana MD    NPI# [id number]   Visit type:  Annual exam.    Accompanied by:  No one.    Source of history:  Self, Medical record.    History limitation:  None.       Chief Complaint   2017 8:56 AM CDT     annual px, refill meds.   also c/o pulled muscles over rib area over the weekend--difficult to breath at times.   also c/o sore throat d3ela--uhxoql fever, body aches.        Well Adult History   Well Adult History             The patient presents for well adult exam.  The patient's general health status is described as good.  The patient's diet is described as balanced and gluten free.  Exercise: occasional.  Associated symptoms consist of none.  Additional pertinent history: daily caffeine use, tobacco use none and occasional alcohol use.       colonoscopy:  n/a  lipid and diabetes screening:  due  prostate cancer screening:  n/a  heart disease risk:  moderate  immunizations:  due for influenza, will obtain at work  other:  Doing well with metoprolol, BP under control, recent chest wall strain playing golf         Review of Systems   Constitutional:  No fever, No chills, No sweats, No weakness, No fatigue.    Eye:  No recent visual problem.    Ear/Nose/Mouth/Throat:  Sore throat, No decreased hearing, No nasal congestion.    Respiratory:  No shortness of breath, No cough.    Cardiovascular:  Negative, No chest pain, No palpitations, No peripheral edema.    Gastrointestinal:  No nausea, No vomiting, No diarrhea, No constipation, No heartburn.     Genitourinary:  No dysuria, No change in urine stream.    Hematology/Lymphatics:  No bruising tendency, No bleeding tendency.    Endocrine:  No cold intolerance, No heat intolerance.    Immunologic:  Negative.    Musculoskeletal:  Muscle pain, No back pain, No neck pain, No joint pain.    Integumentary:  No rash, No dryness, No skin lesion.    Neurologic:  Alert and oriented X4, No headache.    Psychiatric:  No anxiety, No depression.      PHQ9 and CAGE reviewed and discussed with patient.       Health Status   Allergies:    Allergic Reactions (Selected)  No Known Medication Allergies   Medications:  (Selected)   Prescriptions  Prescribed  Metoprolol Tartrate 25 mg oral tablet: See Instructions, Instructions: TAKE ONE TABLET BY MOUTH TWICE DAILY, # 60 tab(s), Type: Soft Stop, Pharmacy: [facility]  cetirizine 10 mg oral tablet: 1 tab(s) ( 10 mg ), po, daily, # 15 tab(s), 0 Refill(s), Type: Maintenance, Pharmacy: [facility], Patient contacted about protocol fill.   Problem list:    All Problems  Achalasia, Esophageal / ICD-9-.0 / Confirmed  Migraine Headache / ICD-9-.90 / Confirmed  Obesity / ICD-9-.00 / Probable  Tobacco abuse / ICD-9-.1 / Confirmed      Histories   Past Medical History:    Active  Tobacco abuse (305.1)  Migraine Headache (346.90)  Achalasia, Esophageal (530.0)   Family History:    Diabetes mellitus  Sister (Kelly)     Procedure history:    Vasectomy (SNOMED CT 33383850) on 12/17/2009 at 39 Years.  Upper endoscopy with balloon dilation of esophagus and distal esophageal biopsies on 12/15/2009 at 39 Years.  heller myotomy w/ Zaki in 1986 at 16 Years.   Social History:        Alcohol Assessment            Current, 1-2 times per week, 3 drinks/episode average.      Tobacco Assessment            Past, Cigarettes, 20 year(s).      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      Home and Environment Assessment            Marital status: .  Spouse/Partner name: Spouse:   Xiomara.  Lives with Spouse.      Exercise and Physical Activity Assessment            Exercise frequency: 3-4 times/week.  Exercise type: Bicycling, Running, Walking.      Sexual Assessment            Sexually active: Yes.  Sexual orientation: Heterosexual.  Other contraceptive use: Hx of Vasectomy.        Physical Examination   Vital Signs   9/6/2017 8:56 AM CDT Temperature Tympanic 97.7 DegF  LOW    Peripheral Pulse Rate 64 bpm    Pulse Site Radial artery    HR Method Manual    Systolic Blood Pressure 126 mmHg    Diastolic Blood Pressure 80 mmHg    Mean Arterial Pressure 95 mmHg    BP Site Right arm    BP Method Manual      Measurements from flowsheet : Measurements   9/6/2017 8:56 AM CDT Height Measured - Standard 76 in    Weight Measured - Standard 252.4 lb    BSA 2.48 m2    Body Mass Index 30.72 kg/m2      General:  Alert and oriented, No acute distress.    Eye:  Pupils are equal, round and reactive to light, Extraocular movements are intact, Normal conjunctiva.    HENT:  Normocephalic, Tympanic membranes are clear, Oral mucosa is moist, No pharyngeal erythema, No sinus tenderness, posterior pharyngeal drainage.    Neck:  Supple, Non-tender, No carotid bruit, No lymphadenopathy, No thyromegaly.    Respiratory:  Lungs are clear to auscultation, Respirations are non-labored, Breath sounds are equal.         Chest wall: Wall tenderness ( Right, Anterior, Posterior, Mid-axillary ).    Cardiovascular:  Normal rate, Regular rhythm, No murmur, No gallop, Good pulses equal in all extremities, Normal peripheral perfusion, No edema.    Gastrointestinal:  Soft, Non-tender, Non-distended, Normal bowel sounds, No organomegaly.    Genitourinary:  No costovertebral angle tenderness.    Lymphatics:  WNL.    Musculoskeletal:  Normal range of motion, Normal strength, No  tenderness, No swelling, No deformity.    Integumentary:  Warm, Dry, No rash.    Neurologic:  Alert, Oriented, Normal sensory, Normal motor function, No focal deficits.    Psychiatric:  Cooperative, Appropriate mood & affect.       Impression and Plan   Diagnosis     Well adult (XOI26-BP Z00.00).     Benign essential HTN (KZK09-ZW I10).     Chest wall injury (NTT34-TD S29.011A).     Mixed hyperlipidemia (PRW07-CV E78.2).     Pre-diabetes (CAL44-FE R73.03).     Course:  Progressing as expected.    Plan:  Discussed health maintenance, diet, exercise, BMI discussed, labs today, continue current medication, tramadol for chest wall pain.    Orders     Orders (Selected)   Outpatient Orders  Ordered (Dispatched)  Basic Metabolic Panel* ([facility]): Specimen Type: Serum, Collection Date: 09/06/17 9:26:00 CDT  Hemoglobin A1c* ([facility]): Specimen Type: Blood, Collection Date: 09/06/17 9:26:00 CDT  Lipid panel with reflex to direct ldl* ([facility]): Specimen Type: Serum, Collection Date: 09/06/17 9:26:00 CDT  Prescriptions  Prescribed  Metoprolol Tartrate 25 mg oral tablet: 1 tab(s) ( 25 mg ), po, bid, # 180 tab(s), 3 Refill(s), Type: Soft Stop, Pharmacy: [id number], 1 tab(s) po bid,x90 day(s)  cetirizine 10 mg oral tablet: 1 tab(s) ( 10 mg ), po, daily, # 90 tab(s), 3 Refill(s), Type: Maintenance, Pharmacy: [facility] [id number], Patient contacted about protocol fill., 1 tab(s) po daily,x90 day(s)  traMADol 50 mg oral tablet: 1 tab(s) ( 50 mg ), PO, q6-8 hrs, PRN: for pain, # 30 tab(s), 1 Refill(s), Type: Maintenance, Pharmacy: [facility] [id number], 1 tab(s) po q6-8 hrs,PRN:for pain.

## 2022-02-16 NOTE — PROGRESS NOTES
Patient:   TOM DRIVER            MRN: [id number]            FIN: [id number]               Age:   47 years     Sex:  Male     :  1970   Associated Diagnoses:   Atrial flutter   Author:   Akin Castillo MD      Visit Information      Date of Service: 2018 06:11 pm  Performing Location: [facility]  Encounter#: [id number]      Primary Care Provider (PCP):  Zoran Quintana MD    NPI# [id number]      Referring Provider:  Akin Castillo MD    NPI# [id number]      Chief Complaint   2018 6:22 PM CST    stopped taking metoprolol and started to have sy. feels like a fib        History of Present Illness   chief complaint and symptoms as noted above confirmed with patient   A few episodes lasting up to an hour No cp sob but rapid      Review of Systems   Constitutional:  Negative except as documented in history of present illness.    Respiratory:  Negative.    Cardiovascular:  Negative except as documented in history of present illness.    Gastrointestinal:  Negative.    Genitourinary:  Negative.    Neurologic:  Negative.       Health Status   Allergies:    Allergic Reactions (Selected)  No Known Medication Allergies   Medications:  (Selected)   Prescriptions  Prescribed  metoprolol tartrate 25 mg oral tablet: = 1 tab(s) ( 25 mg ), po, bid, # 60 tab(s), 0 Refill(s), Type: Maintenance, Pharmacy: [facility] [id number], 1 tab(s) Oral bid  Documented Medications  Documented  Otezla 30 mg oral tablet: = 1 tab(s) ( 30 mg ), Oral, bid, Instructions: Rx'd by Dermatology, 0 Refill(s), Type: Maintenance,    Medications          *denotes recorded medication          *Otezla 30 mg oral tablet: 30 mg, 1 tab(s), Oral, bid, Rx'd by Dermatology, 0 Refill(s).          metoprolol tartrate 25 mg oral tablet: 25 mg, 1 tab(s), po, bid, 60 tab(s), 0 Refill(s).     Problem list:    All Problems  Achalasia, esophageal / SNOMED CT 01874701 / Confirmed  Benign essential HTN / SNOMED CT 5179918 /  Confirmed  H/O vasectomy / SNOMED CT 425096419 / Confirmed  Migraine headache / SNOMED CT 45788472 / Confirmed  Mixed hyperlipidemia / SNOMED CT 302740322 / Confirmed  Obesity / SNOMED CT 5895903171 / Probable  Pre-diabetes / SNOMED CT 4389193491 / Confirmed  Tobacco abuse / ICD-9-.1 / Confirmed      Histories   Past Medical History:    Active  Obesity (2953362813)  Tobacco abuse (305.1)  Migraine headache (14922827)  Achalasia, esophageal (98137863)   Family History:    Diabetes mellitus  Sister (Kelly)     Procedure history:    Vasectomy (SNOMED CT 25228451) performed by Zoran Quintana MD on 12/17/2009 at 39 Years.  Upper endoscopy with balloon dilation of esophagus and distal esophageal biopsies performed by Richard Jesus MD on 12/15/2009 at 39 Years.  heller myotomy w/ Zaki in 1986 at 16 Years.   Social History:        Alcohol Assessment            Current, 1-2 times per week, 3 drinks/episode average.      Tobacco Assessment            Past, Cigarettes, 20 year(s).      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      Home and Environment Assessment            Marital status: .  Spouse/Partner name: Spouse:   Xiomara.  Lives with Spouse.      Exercise and Physical Activity Assessment            Exercise frequency: 3-4 times/week.  Exercise type: Bicycling, Running, Walking.      Sexual Assessment            Sexually active: Yes.  Sexual orientation: Heterosexual.  Other contraceptive use: Hx of Vasectomy.        Physical Examination   Vital Signs   11/6/2018 6:22 PM CST Temperature Tympanic 98.3 DegF    Peripheral Pulse Rate 47 bpm  LOW    HR Method Electronic    Systolic Blood Pressure 120 mmHg    Diastolic Blood Pressure 70 mmHg    Mean Arterial Pressure 87 mmHg    BP Site Right arm    BP Method Manual    Oxygen Saturation 97 %      General:  Alert and oriented, No  acute distress.    Neck:  Supple.    Respiratory:  Respirations are non-labored.    Cardiovascular:  Normal rate, Regular rhythm.    Neurologic:  Alert, Oriented.       Review / Management   ECG interpretation:  Within normal limits.       Impression and Plan   Diagnosis     Atrial flutter (AQS87-YM I48.92).     Course:  Worsening.    Plan:  flutter in 2015 resume metoprolol  add asa  cards consult.    Patient Instructions:       Counseled: Patient, Regarding diagnosis, Regarding treatment, Regarding medications.

## 2022-02-16 NOTE — TELEPHONE ENCOUNTER
Entered by Nathaly Flaherty CMA on October 12, 2021 12:49:05 PM CDT  ---------------------  From: Nathaly Flaherty CMA   To: [facility] #[id number]    Sent: 10/12/2021 12:49:05 PM CDT  Subject: Medication Management     ** Submitted: **  Order:atorvastatin (atorvastatin 20 mg oral tablet)  1 tab(s)  Oral  daily  Qty:  30 tab(s)        Refills:  0          Substitutions Allowed     Route To [facility] [employer] #[id number]    Signed by Nahtaly Flaherty CMA  10/12/2021 5:48:00 PM [facility]    ** Submitted: **  Complete:atorvastatin (atorvastatin 20 mg oral tablet)   Signed by Nathaly Flaherty CMA  10/12/2021 5:48:00 PM [facility]    ** Not Approved:  **  atorvastatin (ATORVASTATIN 20MG TABLETS)  TAKE 1 TABLET BY MOUTH DAILY  Qty:  90 tab(s)        Days Supply:  90        Refills:  0          Substitutions Allowed     Route To [facility] [employer] [facility] #[id number]   Signed by Nathaly Flaherty CMA            ------------------------------------------  From: [facility] #[id number]  To: Zoran Quintana MD  Sent: October 9, 2021 4:47:16 PM CDT  Subject: Medication Management  Due: October 2, 2021 3:22:27 PM CDT     ** On Hold Pending Signature **     Dispensed Drug: atorvastatin (atorvastatin 20 mg oral tablet), TAKE 1 TABLET BY MOUTH DAILY  Quantity: 90 tab(s)  Days Supply: 90  Refills: 0  Substitutions Allowed  Notes from Pharmacy:  ------------------------------------------

## 2022-02-16 NOTE — LETTER
(Inserted Image. Unable to display)   March 03, 2020      TOM DRIVER  [address], [place] [id number]        Dear TOM,      Thank you for selecting [facility] for your healthcare needs.     You had a colonoscopy done for colon cancer screening on Tuesday February 25th, 2020. Three precancerous polyps (tubular adenomas) were removed. I would recommend a follow up colonoscopy in 6-12 months and sooner if new symptoms develop.            Please contact me or my assistant at [phone] if you have any questions or concerns.     Sincerely,        Gage Pyle MD

## 2022-02-16 NOTE — PROGRESS NOTES
Chief Complaint    annual px, refill meds  History of Present Illness      General health status:  good      Diet:  heart healthy      Exercise:  regular running, resistance training      Medical encounters:  none      Dental exam:  1/21      Eye exam:  due      Caffeine use:  daily      Tobacco use:  no      Alcohol use:  occasional      Lipid and diabetes screening:  due      Colon cancer screening:  due 2023      Prostate cancer screening:  n/a      Other concerns: None      Chronic disease: Esophageal achalasia, hypertension, palmar psoriasis, hyperlipidemia are well controlled.  He has increased his activity level, change his diet, and lost weight since his last visit.  Review of Systems          Constitutional:  No fever, No chills, No sweats, No weakness, No fatigue.            Eye:  No recent visual problem.            Ear/Nose/Mouth/Throat:  No decreased hearing, No nasal congestion, No sore throat.            Respiratory:  No shortness of breath, No cough.            Cardiovascular:  Negative, No chest pain, No palpitations, No peripheral edema.            Gastrointestinal:  No nausea, No vomiting, No diarrhea, No constipation, No heartburn.            Genitourinary:  No dysuria, No change in urine stream.            Hematology/Lymphatics:  No bruising tendency, No bleeding tendency.            Endocrine:  No cold intolerance, No heat intolerance.            Immunologic:  Negative.            Musculoskeletal:  No back pain, No neck pain, No joint pain, No muscle pain.            Integumentary:  No rash, No dryness, No skin lesion.            Neurologic:  Alert and oriented X4, No headache.                Psychiatric:  No anxiety, No depression  Physical Exam   Vitals & Measurements    T: 97.7  F (Tympanic)  HR: 54 (Peripheral)  BP: 118/67     HT: 76 in  WT: 224 lb  BMI: 27.26           General:  Alert and oriented, No acute distress.            Eye:  Pupils are equal, round and reactive to light,  Extraocular movements are intact, Normal conjunctiva.            HENT:  Normocephalic, Tympanic membranes are clear, Oral mucosa is moist, No pharyngeal erythema, No sinus tenderness.            Neck:  Supple, Non-tender, No carotid bruit, No lymphadenopathy, No thyromegaly.            Respiratory:  Lungs are clear to auscultation, Respirations are non-labored, Breath sounds are equal, No chest wall tenderness.            Cardiovascular:  Normal rate, Regular rhythm, No murmur, No gallop, Good pulses equal in all extremities, Normal peripheral perfusion, No edema.            Gastrointestinal:  Soft, Non-tender, Non-distended, Normal bowel sounds, No organomegaly.              Genitourinary:  No CVA tenderness          Lymphatics:  WNL.            Musculoskeletal:  Normal range of motion, Normal strength, No tenderness, No swelling, No deformity.            Integumentary:  Warm, Dry, No rash.            Neurologic:  Alert, Oriented, Normal sensory, Normal motor function, No focal deficits.            Psychiatric:  Cooperative, Appropriate mood & affect.   Assessment/Plan       1. Annual physical exam (Z00.00)         Discussed diet, weight management, BMI, activity and exercise         Follow up in one year                2. Achalasia, esophageal (K22.0)          Progressing as expected, continue to observe                3. Benign essential HTN (I10)          Hypertension under much better control with increased activity and weight loss.  He reports some mild postural hypotension.  Metoprolol decreased to 12 and half milligrams twice daily.  He will continue to monitor his blood pressure and if his numbers remain low we will have him wean off the medication.         Ordered:          atorvastatin, = 1 tab(s), Oral, daily, # 90 tab(s), 3 Refill(s), Type: Maintenance, Pharmacy: [facility] #[id number], 1 tab(s) Oral daily,x90 day(s), 76, in, 10/28/21 14:01:00 CDT, Height Measured, 224, lb, 10/28/21 14:01:00 CDT,  Weight Measured, (Ordered)          Basic Metabolic Panel* ([facility]), Specimen Type: Serum, Collection Date: 10/28/21 14:38:00 CDT          Lipid panel with reflex to direct ldl* ([facility]), Specimen Type: Serum, Collection Date: 10/28/21 14:38:00 CDT                4. 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 Mixed hyperlipidemia (E78.2)          Controlled, check lipids today, continue current medication         Ordered:          metoprolol, = 0.5 tab(s) ( 12.5 mg ), Oral, bid, # 90 tab(s), 1 Refill(s), Type: Maintenance, Pharmacy: [facility] #[id number], 0.5 tab(s) Oral bid,x90 day(s), 76, in, 10/28/21 14:01:00 CDT, Height Measured, 224, lb, 10/28/21 14:01:00 CDT, Weight Measured, (Ordered)          Basic Metabolic Panel* ([facility]), Specimen Type: Serum, Collection Date: 10/28/21 14:38:00 CDT          Lipid panel with reflex to direct ldl* ([facility]), Specimen Type: Serum, Collection Date: 10/28/21 14:38:00 CDT                6. Pre-diabetes (R73.03)          Check hemoglobin A1c today.  I suspect his numbers will be better given his change in lifestyle         Ordered:          Hemoglobin A1c* ([facility]), Specimen Type: Blood, Collection Date: 10/28/21 14:38:00 CDT                Orders:         atorvastatin, = 1 tab(s), Oral, daily, # 30 tab(s), 0 Refill(s), Type: Hard Stop, Pharmacy: [facility] #[id number], 76, in, 10/20/20 16:01:00 CDT, Height Measured, 235.6, lb, 10/20/20 16:01:00 CDT, Weight Measured, (Completed)         Return to Clinic (Request), RFV: fasting labs per GTG: lipid panel, BMP, hgb a1c. Dx: screening lipid/DM and HTN, Return in 1 week         Return to Clinic (Request), RFV: Yearly px, Return in 1 year         Return to Clinic (Request), RFV: Yearly px, Return in 1 year  Patient Information     Name:TOM DRIVER      Address:      [address] [place] [id number]     Sex:Male     YOB: 1970      [phone]     Emergency Contact:MATTY DRIVER     MRN:[id number]     FIN:[id number]     Location:[facility]     Date of Service:10/28/2021      Primary Care Physician:       Zoran Quintana MD, [phone]      Attending Physician:       Zoran Quintana MD, [phone]  Problem List/Past Medical History    Ongoing     Achalasia, esophageal     Benign essential HTN     H/O vasectomy     Migraine headache     Mixed hyperlipidemia     Obesity     Pre-diabetes     Psoriasis     Pustular psoriasis of palms and soles     Tubular adenoma    Historical     Tobacco abuse  Procedure/Surgical History     Colonoscopy (11/17/2020)      Comments: Sedation: conscious      Polyps: tubular adenoma      Diverticuli: no      Follow up:  3 years (2023).     Colonoscopy (02/25/2020)      Comments: Sedation:      Polyps: tubular adenoma      Diverticuli: no      Follow up: 6-12 months.     Screening for malignant neoplasm of colon (01/14/2020)      Comments: Recommendation:  needs colonoscopy. Cologuard - Positive.     Single wedge excision of lung (09/25/2019)      Comments: benign lesion.     Vasectomy (12/17/2009)     Upper endoscopy with balloon dilation of esophagus and distal esophageal biopsies (12/15/2009)     heller myotomy w/ Zaki (1986)  Medications    aspirin 81 mg oral delayed release tablet, 81 mg= 1 tab(s), Oral, daily    atorvastatin 20 mg oral tablet, 1 tab(s), Oral, daily, 3 refills    betamethasone valerate 0.1% topical cream, 1 liu, Topical, daily, 11 refills    calcipotriene 0.005% topical cream, 1 liu, Topical, hs, 11 refills    Lac-Hydrin 12% topical cream, 1 liu, Topical, bid, 11 refills    Metoprolol Tartrate 25 mg oral tablet, 12.5 mg= 0.5 tab(s), Oral, bid, 1 refills    Otezla 30 mg oral tablet, 30 mg= 1 tab(s), Oral, bid    ZyrTEC 10 mg oral tablet, 10 mg= 1 tab(s), Oral, daily  Allergies    No Known Medication Allergies  Social History    Smoking Status     Former smoker     Alcohol       Past, 1-2 times per week, 3 drinks/episode average.     Electronic Cigarette/Vaping      Electronic Cigarette Use: Never.     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     Exercise      Exercise frequency: 3-4 times/week. Exercise type: Bicycling, Running, Walking.     Home/Environment      Marital status: . Spouse/Partner name: Spouse: Xiomara. Lives with Spouse.     Nutrition/Health      Type of diet: Regular.     Sexual      Sexually active: Yes. Sexual orientation: Heterosexual. Other contraceptive use: Hx of Vasectomy.     Substance Abuse      Never     Tobacco      Former smoker, quit more than 30 days ago  Family History    Alcohol abuse: Father.    Alzheimer's disease: Grandmother (M).    CA - Cancer of colon: Grandfather (M).    Diabetes mellitus: Sister.    Mother: History is negative    Sister: History is negative  Immunizations          Scheduled Immunizations          Dose Date(s)          Hep B          03/15/2010, 03/14/2008, 08/18/2010          influenza virus vaccine, inactivated          09/20/2011, 10/05/2016, 09/25/2012, 10/01/2009, 11/04/2010, 09/26/2018, 11/11/2019, 10/20/2020          SARS-CoV-2 (COVID-19) Moderna-1273          01/21/2021, 02/18/2021          Other Immunizations          influenza virus vaccine, inactivated          09/28/2018          tetanus/diphth/pertuss (Tdap) adult/adol          02/04/2008, 04/13/2018          influenza, H1N1, inactivated          11/13/2009

## 2022-02-16 NOTE — LETTER
(Inserted Image. Unable to display)     October 08, 2019      TOM DRIVER  [address], [place] [id number]          Dear TOM,      Thank you for selecting [facility] (previously [facility]) for your healthcare needs.    Our records indicate you are due for the following services:    Annual Physical.    Fasting Lab Tests ~ Please do not eat or drink anything 10 hours prior to your scheduled appointment time.  (Water and any medications that you may need are allowed unless directed otherwise.)    If you had your labs done at another facility or with Direct Access Lab Testing at [facility], please bring in a copy of the results to your next visit, mail a copy, or drop off a copy of your results to your Healthcare Provider.      You are due for lab work and an office visit; please schedule the lab appointment 1 week before the office visit.  This will assure all results are available to discuss with your provider during your visit.    **It is very helpful if you bring your medication bottles to your appointment.  This assures we have all of your current medications, including strength and dosing information, documented accurately in your medical record.    To schedule an appointment or if you have further questions, please contact your primary clinic:   [facility]       [phone]   [facility]       [phone]              [facility]     [phone]      Powered by [employer]    Sincerely,    Akin Castillo MD

## 2022-02-16 NOTE — PROGRESS NOTES
Chief Complaint    annual px, refill meds.  received flu shot earlier today at work.  History of Present Illness      General health status:  good      Diet:  gluten reduced      Exercise:  walking      Medical encounters:  none      Dental exam:  due      Eye exam:  due      Caffeine use:  daily      Tobacco use:  no      Alcohol use:  weekly      Lipid and diabetes screening: Due      Colon cancer screening: Due for repeat colonoscopy      Prostate cancer screening: N/A      Other concerns:  tinnitus for last 10 months, some ear fullness      ? hearing loss       He has recent history of removal of a benign neoplasm in his left lung.  He would like a chest x-ray for follow-up.       Chronic disease: Blood pressure and palpitations under control with metoprolol.  Psoriasis well-controlled, sees rheumatology.          Review of Systems          Constitutional:  No fever, No chills, No sweats, No weakness, No fatigue.            Eye:  No recent visual problem.            Ear/Nose/Mouth/Throat:  No decreased hearing, No nasal congestion, No sore throat.            Respiratory:  No shortness of breath, No cough.            Cardiovascular:  Negative, No chest pain, No palpitations, No peripheral edema.            Gastrointestinal:  No nausea, No vomiting, No diarrhea, No constipation, No heartburn.            Genitourinary:  No dysuria, No change in urine stream.            Hematology/Lymphatics:  No bruising tendency, No bleeding tendency.            Endocrine:  No cold intolerance, No heat intolerance.            Immunologic:  Negative.            Musculoskeletal:  No back pain, No neck pain, No joint pain, No muscle pain.            Integumentary:  No rash, No dryness, No skin lesion.            Neurologic:  Alert and oriented X4, No headache.                Psychiatric:  No anxiety, No depression  Physical Exam   Vitals & Measurements    T: 97.4  F (Tympanic)  HR: 67 (Peripheral)  BP: 138/82  SpO2: 97%     HT: 76 in   WT: 235.6 lb  BMI: 28.68           General:  Alert and oriented, No acute distress.            Eye:  Pupils are equal, round and reactive to light, Extraocular movements are intact, Normal conjunctiva.            HENT:  Normocephalic, Tympanic membranes are clear, Oral mucosa is moist, No pharyngeal erythema, No sinus tenderness.            Neck:  Supple, Non-tender, No carotid bruit, No lymphadenopathy, No thyromegaly.            Respiratory:  Lungs are clear to auscultation, Respirations are non-labored, Breath sounds are equal, No chest wall tenderness.            Cardiovascular:  Normal rate, Regular rhythm, No murmur, No gallop, Good pulses equal in all extremities, Normal peripheral perfusion, No edema.            Gastrointestinal:  Soft, Non-tender, Non-distended, Normal bowel sounds, No organomegaly.            Genitourinary:  No CVA tenderness          Lymphatics:  WNL.            Musculoskeletal:  Normal range of motion, Normal strength, No tenderness, No swelling, No deformity.            Integumentary:  Warm, Dry, No rash.            Neurologic:  Alert, Oriented, Normal sensory, Normal motor function, No focal deficits.            Psychiatric:  Cooperative, Appropriate mood & affect.   Assessment/Plan       1. Annual physical exam (Z00.00)          Weight loss, regular exercise, and appropriate diet discussed.  He is due for repeat colonoscopy and will get this scheduled.                2. Benign essential HTN (I10)          Blood pressure controlled.  Continue with current dose of metoprolol.         Ordered:          Return to Clinic (Request), RFV: fasting labs per GTG: lipid panel, BMP, hgb a1c. Dx: screening lipid/DM and HTN, Return in 1 week                3. Achalasia, esophageal (K22.0)          Currently fairly asymptomatic.  He is due for follow-up endoscopy.  We will see if this can be scheduled with his next colonoscopy                4. History of benign neoplasm of bronchus and lung  (Z87.09)          Chest x-ray was ordered and reviewed today.  It does not appear to be any new lesions.  Await the formal radiology report.  Patient has been notified of the findings.         Ordered:          XR Chest PA/Lat (Request), History of benign neoplasm of bronchus and lung                5. Psoriasis (L40.9)          Controlled, continue with current medication follow-up with rheumatology.                6. Metabolic syndrome (E88.81)          We have discussed the increased risk of heart disease given metabolic syndrome.  His LDL and triglyceride levels are elevated.  Repeat lipid panel and hemoglobin A1c today.  He will start on atorvastatin 20 mg daily.                7. Tinnitus (H93.19)          Referral to audiology for further evaluation.  He does endorse mild high-frequency hearing loss.         Ordered:          Referral (Request), 10/20/20 17:06:00 CDT, Referred to: Audiology, Referred to: Dodie Short, Reason for referral: tinnitus, Tinnitus                Orders:         atorvastatin, = 1 tab(s) ( 20 mg ), Oral, daily, # 90 tab(s), 3 Refill(s), Type: Maintenance, Pharmacy: [facility] #[id number], 1 tab(s) Oral daily, 76, in, 10/20/20 16:01:00 CDT, Height Measured, 235.6, lb, 10/20/20 16:01:00 CDT, Weight Measured, (Ordered)         Return to Clinic (Request), RFV: Yearly px, Return in 1 year  Patient Information     Name:TOM DRIVER      Address:      [address] [place] [id number]     Sex:Male     YOB: 1970     [phone]     Emergency Contact:MATTY DRIVER     MRN:[id number]     FIN:[id number]     Location:[facility]     Date of Service:10/20/2020      Primary Care Physician:       Akin Castillo MD, [phone]      Attending Physician:       Zoran Quintana MD, [phone]  Problem List/Past Medical History    Ongoing     Achalasia, esophageal     Benign essential HTN     H/O vasectomy     Migraine  headache     Mixed hyperlipidemia     Obesity     Pre-diabetes     Psoriasis     Tubular adenoma    Historical     Tobacco abuse  Procedure/Surgical History     Colonoscopy (02/25/2020)      Comments: Sedation:      Polyps: tubular adenoma      Diverticuli: no      Follow up: 6-12 months.     Screening for malignant neoplasm of colon (01/14/2020)      Comments: Recommendation:  needs colonoscopy. Cologuard - Positive.     Single wedge excision of lung (09/25/2019)      Comments: benign lesion.     Vasectomy (12/17/2009)     Upper endoscopy with balloon dilation of esophagus and distal esophageal biopsies (12/15/2009)     heller myotomy w/ Zaki (1986)  Medications    aspirin 81 mg oral delayed release tablet, 81 mg= 1 tab(s), Oral, daily    atorvastatin 20 mg oral tablet, 20 mg= 1 tab(s), Oral, daily, 3 refills    betamethasone valerate 0.1% topical cream, 1 liu, Topical, daily, 11 refills    calcipotriene 0.005% topical cream, 1 liu, Topical, hs, 11 refills    Lac-Hydrin 12% topical cream, 1 liu, Topical, bid, 11 refills    Metoprolol Tartrate 25 mg oral tablet, 1 tab(s), Oral, bid, 3 refills    Otezla 30 mg oral tablet, 30 mg= 1 tab(s), Oral, bid    ZyrTEC 10 mg oral tablet, 10 mg= 1 tab(s), Oral, daily  Allergies    No Known Medication Allergies  Social History    Smoking Status     Former smoker     Alcohol      Past, 1-2 times per week, 3 drinks/episode average.     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     Exercise      Exercise frequency: 3-4 times/week. Exercise type: Bicycling, Running, Walking.     Home/Environment      Marital status: . Spouse/Partner name: Spouse: Xiomara. Lives with Spouse.     Nutrition/Health      Type of diet: Regular.     Sexual      Sexually active: Yes. Sexual orientation: Heterosexual. Other contraceptive use: Hx of Vasectomy.     Substance Abuse      Never     Tobacco      Past, Cigarettes, 20 year(s).  Family  History    Alcohol abuse: Father.    CA - Cancer of colon: Grandfather (M).    Diabetes mellitus: Sister.    Mother: History is negative    Sister: History is negative  Immunizations      Vaccine Date Status          influenza virus vaccine, inactivated 11/11/2019 Recorded          influenza virus vaccine, inactivated 09/28/2018 Recorded          influenza virus vaccine, inactivated 09/26/2018 Recorded          tetanus/diphth/pertuss (Tdap) adult/adol 04/13/2018 Recorded          influenza virus vaccine, inactivated 10/05/2016 Recorded          influenza virus vaccine, inactivated 09/25/2012 Recorded          influenza virus vaccine, inactivated 09/20/2011 Recorded          influenza virus vaccine, inactivated 11/04/2010 Recorded          Hep B 08/18/2010 Recorded          Hep B 03/15/2010 Recorded          influenza, H1N1, inactivated 11/13/2009 Recorded          influenza virus vaccine, inactivated 10/01/2009 Recorded          Hep B 03/14/2008 Recorded          tetanus/diphth/pertuss (Tdap) adult/adol 02/04/2008 Recorded

## 2022-02-16 NOTE — NURSING NOTE
Vital Signs Entered On:  11/6/2020 8:23 AM CST    Performed On:  11/6/2020 8:23 AM CST by Kelsey Guadalupe CMA               Vital Signs   Systolic Blood Pressure :   107 mmHg   Diastolic Blood Pressure :   68 mmHg   Mean Arterial Pressure :   81 mmHg   BP Site :   Left arm   Peripheral Pulse Rate :   54 bpm (LOW)    Kelsey Guadalupe CMA - 11/6/2020 8:23 AM CST

## 2022-02-16 NOTE — PROGRESS NOTES
"Chief Complaint    skin issues and recent joint issues  History of Present Illness      Isai is a healthy 47-year-old who complains of 2 things.  Chronic and recurring pustular rash involving only the palms and soles tends involve both sides.  He generally has some degree of rash there he will \"squeeze\" the pustules to drain them oftentimes in the crust.  Tends to involve the mid part of the foot not the fingers or toes.  He has episodic red tender swollen arthritis of the clavicles and wrists.  Clavicles more severe than the wrist.  No morning stiffness.  No other joint inflammation.  No back pain.  No fever  Review of Systems      No fever, chills, weight loss, night sweats, rhinitis, pharyngitis, eye complaints, cough, dyspnea, chest pain, edema, abdominal pain, diarrhea, nausea or vomiting, headache, dysuria.  Physical Exam   Vitals & Measurements    HR: 69(Peripheral)  BP: 137/85     HT: 76 in  WT: 254 lb  BMI: 30.91       Patient is a healthy-appearing man in no distress.  Alert and oriented.  Good spirits.  Eyes appear normal.  H&T exam is unremarkable.  Neck supple no thyromegaly.  Carotid pulsations normal.  Chest clear.  Cardiac exam regular.  No edema.  Pulses and sensation are normal in the feet.  Abdomen is a scar from his Nissen.  No mass organomegaly.  Examination of the skin   Assessment/Plan       Inflammatory arthritis         Suspicious for psoriatic arthritis given the skin condition.  Would like to get a definite diagnosis of the skin condition before further workup.         Ordered:          [id number] office outpatient new 30 minutes (Charge), Quantity: 1, Inflammatory arthritis  Pustular psoriasis of palms and soles          Referral (Request), 03/08/18 16:32:00 CST, Referred to: Dermatology, Reason for referral: Rash of palms and soles with episodes of inflammatory arthritis, Pustular psoriasis of palms and soles  Inflammatory arthritis                Pustular psoriasis of palms and soles    "      Differential psoriasis hidrotic eczema.  Dermatology referral.         Ordered:          [id number] office outpatient new 30 minutes (Charge), Quantity: 1, Inflammatory arthritis  Pustular psoriasis of palms and soles          Referral (Request), 03/08/18 16:32:00 CST, Referred to: Dermatology, Reason for referral: Rash of palms and soles with episodes of inflammatory arthritis, Pustular psoriasis of palms and soles  Inflammatory arthritis           Patient Information     Name:TOM DRIVER      Address:      [address] [place] [id number]     Sex:Male     YOB: 1970     [phone]     Emergency Contact:[facility] EMERGENCY, CONTACT     MRN:[id number]     FIN:[id number]     Location:[facility]     Date of Service:03/08/2018      Primary Care Physician:       Zoran Quintana MD, [phone]  Problem List/Past Medical History    Ongoing     Achalasia, esophageal     Benign essential HTN     Migraine headache     Mixed hyperlipidemia     Obesity     Pre-diabetes     Tobacco abuse    Historical  Procedure/Surgical History     Vasectomy (12/17/2009)     Upper endoscopy with balloon dilation of esophagus and distal esophageal biopsies (12/15/2009)     heller myotomy w/ Zaki (1986)  Medications        cetirizine 10 mg oral tablet: 10 mg, 1 tab(s), po, daily, for 90 day(s), 90 tab(s), 3 Refill(s).        Metoprolol Tartrate 25 mg oral tablet: 25 mg, 1 tab(s), po, bid, for 90 day(s), 180 tab(s), 3 Refill(s).                Allergies    No Known Medication Allergies  Social History    Smoking Status - 03/08/2018     Former smoker     Alcohol - 06/13/2016      Current, 1-2 times per week, 3 drinks/episode average.     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     Exercise and Physical Activity - 06/13/2016      Exercise frequency: 3-4 times/week. Exercise type: Bicycling, Running,  Walking.     Home and Environment - 06/13/2016      Marital status: . Spouse/Partner name: Spouse: Xiomara. Lives with Spouse.     Sexual - 06/13/2016      Sexually active: Yes. Sexual orientation: Heterosexual. Other contraceptive use: Hx of Vasectomy.     Substance Abuse - 06/13/2016      Never     Tobacco - 06/13/2016      Past, Cigarettes, 20 year(s).  Family History    Diabetes mellitus: Sister.  Immunizations      Vaccine Date Status      influenza virus vaccine, inactivated 10/05/2016 Recorded      influenza virus vaccine, inactivated 09/25/2012 Recorded      influenza virus vaccine, inactivated 09/20/2011 Recorded      Hep B 08/18/2010 Recorded      Hep B 03/15/2010 Recorded      influenza, H1N1, inactivated 11/13/2009 Recorded      Hep B 03/14/2008 Recorded      tetanus/diphth/pertuss (Tdap) adult/adol 02/04/2008 Recorded  Lab Results      Results (Last 90 days)      No results located.

## 2022-02-16 NOTE — LETTER
(Inserted Image. Unable to display)   October 28, 2020        TOM DRIVER  [address], [place] [id number]        Dear TOM,      Thank you for selecting [facility] for your healthcare needs.    Our records indicate you are due for the following services:     Fasting Lab Tests ~ Please do not eat or drink anything 10 hours prior to your scheduled appointment time.  (Water and any medications that you may need are allowed unless directed otherwise.)    If you had your labs done at another facility or with Direct Access Lab Testing at [facility], please bring in a copy of the results to your next visit, mail a copy, or drop off a copy of your results to your Healthcare Provider.    (FYI   Regarding office visits: In some instances, a video visit or telephone visit may be offered as an option.)        To schedule an appointment or if you have further questions, please contact your clinic at [phone].      Powered by [employer]    Sincerely,    Zoran Quintana M.D.

## 2022-02-16 NOTE — TELEPHONE ENCOUNTER
Entered by Nathaly Flaherty CMA on December 06, 2019 5:52:43 PM CST  ---------------------  From: Nathaly Flaherty CMA   To: [facility] #[id number]    Sent: 12/6/2019 5:52:43 PM CST  Subject: Medication Management     ** Submitted: **  Order:metoprolol (Metoprolol Tartrate 25 mg oral tablet)  1 tab(s)  Oral  bid  Qty:  60 tab(s)        Days Supply:  30        Refills:  0          Substitutions Allowed     Route To Pharmacy - [facility] #[id number]    Signed by Nathaly Flaherty CMA  12/6/2019 5:52:00 PM    ** Submitted: **  Complete:metoprolol (Metoprolol Tartrate 25 mg oral tablet)   Signed by Nathaly Flaherty CMA  12/6/2019 5:52:00 PM    ** Not Approved:  **  metoprolol (METOPROLOL TARTRATE 25MG TABLETS)  TAKE 1 TABLET BY MOUTH TWICE DAILY  Qty:  60 tab(s)        Days Supply:  30        Refills:  0          Substitutions Allowed     Route To Pharmacy - [facility] #[id number]   Signed by Nathaly Flaherty CMA            ------------------------------------------  From: [facility] #[id number]  To: Akin Castillo MD  Sent: December 6, 2019 12:40:23 PM CST  Subject: Medication Management  Due: December 7, 2019 12:40:22 PM CST    ** On Hold Pending Signature **  Drug: metoprolol (Metoprolol Tartrate 25 mg oral tablet)  1 TAB(S) ORAL BID  Quantity: 60 tab(s)  Days Supply: 0  Refills: 0  Substitutions Allowed  Notes from Pharmacy: Due for visit for further refills    Dispensed Drug: metoprolol (Metoprolol Tartrate 25 mg oral tablet)  TAKE 1 TABLET BY MOUTH TWICE DAILY  Quantity: 60 tab(s)  Days Supply: 30  Refills: 0  Substitutions Allowed  Notes from Pharmacy:   ---------------------------------------------------------------  From: Nathaly Flaherty CMA (eRx Pool ([id number]_[facility]))   To: Phone Messages Pool ([id number]_[place] - [place]);     Sent: 12/6/2019 5:53:25 PM CST  Subject: FW: Medication Management   Due Date/Time: 12/7/2019 12:40:00 PM CST     Med Refill      Date of last office visit and reason:  11/6/18;  Atrial flutter      Date of last Med Check / Px:   10/5/18; annual exam  Date of last labs pertaining to med:  10/5/18    RTC order in chart:  yes; due now for px    For Protocol refill, has patient been contacted:  msg sent to pharmacy **Please remind pt**

## 2022-02-16 NOTE — NURSING NOTE
Comprehensive Intake Entered On:  10/28/2021 2:07 PM CDT    Performed On:  10/28/2021 2:01 PM CDT by Yazmin CURRAN, Yas               Summary   Chief Complaint :   annual px, refill meds   Advance Directive :   No   Weight Measured :   224 lb(Converted to: 224 lb 0 oz, 101.605 kg)    Height Measured :   76 in(Converted to: 6 ft 4 in, 193.04 cm)    Body Mass Index :   27.26 kg/m2 (HI)    Body Surface Area :   2.33 m2   Systolic Blood Pressure :   118 mmHg   Diastolic Blood Pressure :   67 mmHg   Mean Arterial Pressure :   84 mmHg   Peripheral Pulse Rate :   54 bpm (LOW)    BP Site :   Right arm   Pulse Site :   Radial artery   BP Method :   Electronic   HR Method :   Electronic   Temperature Tympanic :   97.7 DegF(Converted to: 36.5 DegC)  (LOW)    Yas Arce MA - 10/28/2021 2:01 PM CDT   Health Status   Allergies Verified? :   Yes   Medication History Verified? :   Yes   Medical History Verified? :   Yes   Pre-Visit Planning Status :   Completed   Tobacco Use? :   Former smoker   Yas Arce MA - 10/28/2021 2:01 PM CDT   Consents   Consent for Immunization Exchange :   Consent Granted   Consent for Immunizations to Providers :   Consent Granted   Yas Arce MA - 10/28/2021 2:01 PM CDT   Meds / Allergies   (As Of: 10/28/2021 2:07:09 PM CDT)   Allergies (Active)   No Known Medication Allergies  Estimated Onset Date:   Unspecified ; Created By:   Fei Hicks CMA; Reaction Status:   Active ; Category:   Drug ; Substance:   No Known Medication Allergies ; Type:   Allergy ; Updated By:   Fei Hicks CMA; Reviewed Date:   11/6/2018 6:53 PM CST        Medication List   (As Of: 10/28/2021 2:07:09 PM CDT)   Prescription/Discharge Order    ammonium lactate topical  :   ammonium lactate topical ; Status:   Prescribed ; Ordered As Mnemonic:   Lac-Hydrin 12% topical cream ; Simple Display Line:   1 liu, Topical, bid, 140 gm, 11 Refill(s) ; Ordering Provider:   Akin Castillo MD; Catalog Code:    ammonium lactate topical ; Order Dt/Tm:   1/8/2020 4:40:50 PM CST          atorvastatin  :   atorvastatin ; Status:   Prescribed ; Ordered As Mnemonic:   atorvastatin 20 mg oral tablet ; Simple Display Line:   1 tab(s), Oral, daily, 30 tab(s), 0 Refill(s) ; Ordering Provider:   Zoran Quintana MD; Catalog Code:   atorvastatin ; Order Dt/Tm:   10/12/2021 12:48:43 PM CDT          betamethasone topical  :   betamethasone topical ; Status:   Prescribed ; Ordered As Mnemonic:   betamethasone valerate 0.1% topical cream ; Simple Display Line:   1 liu, Topical, daily, 45 gm, 11 Refill(s) ; Ordering Provider:   Akin Castillo MD; Catalog Code:   betamethasone topical ; Order Dt/Tm:   1/8/2020 4:36:08 PM CST          calcipotriene topical  :   calcipotriene topical ; Status:   Prescribed ; Ordered As Mnemonic:   calcipotriene 0.005% topical cream ; Simple Display Line:   1 liu, Topical, hs, 100 gm, 11 Refill(s) ; Ordering Provider:   Akin Castillo MD; Catalog Code:   calcipotriene topical ; Order Dt/Tm:   1/8/2020 4:36:08 PM CST          metoprolol  :   metoprolol ; Status:   Prescribed ; Ordered As Mnemonic:   Metoprolol Tartrate 25 mg oral tablet ; Simple Display Line:   1 tab(s), Oral, bid, 60 tab(s), 0 Refill(s) ; Ordering Provider:   Akin Castillo MD; Catalog Code:   metoprolol ; Order Dt/Tm:   10/12/2021 12:46:43 PM CDT            Home Meds    apremilast  :   apremilast ; Status:   Documented ; Ordered As Mnemonic:   Otezla 30 mg oral tablet ; Simple Display Line:   30 mg, 1 tab(s), Oral, bid, 0 Refill(s) ; Catalog Code:   apremilast ; Order Dt/Tm:   10/20/2020 4:05:13 PM CDT          aspirin  :   aspirin ; Status:   Documented ; Ordered As Mnemonic:   aspirin 81 mg oral delayed release tablet ; Simple Display Line:   81 mg, 1 tab(s), Oral, daily, 0 Refill(s) ; Catalog Code:   aspirin ; Order Dt/Tm:   10/20/2020 4:05:27 PM CDT          cetirizine  :   cetirizine ; Status:   Documented ; Ordered As  Mnemonic:   ZyrTEC 10 mg oral tablet ; Simple Display Line:   10 mg, 1 tab(s), Oral, daily, 0 Refill(s) ; Catalog Code:   cetirizine ; Order Dt/Tm:   10/20/2020 4:05:37 PM CDT            Social History   Social History   (As Of: 10/28/2021 2:07:09 PM CDT)   Alcohol:        Past, 1-2 times per week, 3 drinks/episode average.   (Last Updated: 10/20/2020 6:01:32 PM CDT by Yas Arce MA)          Tobacco:        Past, Cigarettes, 20 year(s).   (Last Updated: 1/4/2012 4:50:09 PM CST by Yas Arce MA)   Former smoker, quit more than 30 days ago   (Last Updated: 10/28/2021 2:05:20 PM CDT by Yas Arce MA)          Electronic Cigarette/Vaping:        Electronic Cigarette Use: Never.   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   (Last Updated: 1/8/2020 4:39:30 PM CST by Akin Castillo MD)          Home/Environment:        Marital status: .  Spouse/Partner name: Spouse:   Xiomara.  Lives with Spouse.   (Last Updated: 1/4/2012 4:50:36 PM CST by Yas Arce MA)          Nutrition/Health:        Type of diet: Regular.   (Last Updated: 10/20/2020 6:01:49 PM CDT by Yas Arce MA)          Exercise:        Exercise frequency: 3-4 times/week.  Exercise type: Bicycling, Running, Walking.   (Last Updated: 6/13/2016 10:30:27 AM CDT by Catie Vilchis CMA)          Sexual:        Sexually active: Yes.  Sexual orientation: Heterosexual.  Other contraceptive use: Hx of Vasectomy.   (Last Updated: 4/16/2015 11:22:54 AM CDT by Dominick Levi CMA)

## 2022-02-16 NOTE — TELEPHONE ENCOUNTER
Entered by Tamika Spencer CMA on October 19, 2021 8:04:47 AM CDT  ---------------------  From: Tamika Spencer CMA   To: [facility] #[id number]    Sent: 10/19/2021 8:04:47 AM CDT  Subject: FW: Medication Management     ** Not Approved: Patient needs appointment **  metoprolol (METOPROLOL TARTRATE 25MG TABLETS)  TAKE 1 TABLET BY MOUTH TWICE DAILY  Qty:  180 tab(s)        Days Supply:  90        Refills:  0          Substitutions Allowed     Route To Pharmacy - [facility] #[id number]   Note from Pharmacy:  **Patient requests 90 days supply**  Signed by Tamika Spencer CMA            ---------------------  From: Tamika Spencer CMA ([facility] [employer] ([id number]))   To: TFS Message Pool ([id number]_[place]);     Sent: 10/15/2021 2:07:11 PM CDT  Subject: FW: Medication Management   Due Date/Time: 10/15/2021 12:47:00 PM CDT           ------------------------------------------  From: [facility] #[id number]  To: Akin Castillo MD  Sent: October 12, 2021 12:47:26 PM CDT  Subject: Medication Management  Due: October 2, 2021 3:22:27 PM CDT     ** On Hold Pending Signature **     Dispensed Drug: metoprolol (Metoprolol Tartrate 25 mg oral tablet), TAKE 1 TABLET BY MOUTH TWICE DAILY  Quantity: 180 tab(s)  Days Supply: 90  Refills: 0  Substitutions Allowed  Notes from Pharmacy: **Patient requests 90 days supply**  ------------------------------------------Message sent to [employer] to call pt and schedule annual exam.

## 2022-02-16 NOTE — TELEPHONE ENCOUNTER
Entered by Jessica Cunningham CMA on May 10, 2019 2:56:40 PM CDT  ---------------------  From: eJssica Cunningham CMA   To: [id number]    Sent: 5/10/2019 2:56:40 PM CDT  Subject: Medication Management     ** Submitted: **  Order:metoprolol (Metoprolol Tartrate 25 mg oral tablet)  1 tab(s)  Oral  bid  Qty:  180 tab(s)        Refills:  1          Substitutions Allowed     Route To Pharmacy - [facility]    Signed by Jessica Cunningham CMA  5/10/2019 2:56:00 PM    ** Submitted: **  Complete:metoprolol (metoprolol tartrate 25 mg oral tablet)   Signed by Jessica Cunningham CMA  5/10/2019 2:56:00 PM    ** Not Approved:  **  metoprolol (METOPROLOL TARTRATE 25MG TABLETS)  TAKE 1 TABLET BY MOUTH TWICE DAILY  Qty:  60 tab(s)        Days Supply:  30        Refills:  0          Substitutions Allowed     Route To [facility]   Signed by Jessica Cunningham CMA          RTC placed for 10/2019 AWV  last seen 11/2018 atrial flutter and f/u with cardio      ------------------------------------------  From: [facility]  To: Akin Castillo MD  Sent: May 8, 2019 8:03:59 PM CDT  Subject: Medication Management  Due: May 9, 2019 8:03:59 PM CDT    ** On Hold Pending Signature **  Drug: metoprolol (Metoprolol Tartrate 25 mg oral tablet)  1 TAB(S) ORAL BID  Quantity: 60 tab(s)     Days Supply: 0         Refills: 2  Substitutions Allowed  Notes from Pharmacy:     Dispensed Drug: metoprolol (Metoprolol Tartrate 25 mg oral tablet)  TAKE 1 TABLET BY MOUTH TWICE DAILY  Quantity: 60 tab(s)     Days Supply: 30        Refills: 0  Substitutions Allowed  Notes from Pharmacy:   ------------------------------------------

## 2022-02-16 NOTE — TELEPHONE ENCOUNTER
Order faxed to [facility], they will contact patient to schedule.

## 2022-02-16 NOTE — LETTER
(Inserted Image. Unable to display)   January 09, 2020      TOM DRIVER      [address], [place] [id number]        Dear TOM,    Thank you for selecting [facility] for your healthcare needs.  Below you will find the results of the recent tests done at our clinic.     See me to discuss.      Result Name Current Result Previous Result Reference Range   Potassium Level (mmol/L)  4.2 1/8/2020  4.4 10/5/2018 3.5 - 5.3   Glucose Level (mg/dL)  89 1/8/2020 ((H)) 104 10/5/2018 65 - 139   Creatinine Level (mg/dL)  1.12 1/8/2020  0.96 10/5/2018 0.70 - 1.33   Hgb A1c ((H)) 5.9 1/8/2020  5.6 10/5/2018  - <5.7   Cholesterol (mg/dL) ((H)) 242 1/8/2020 ((H)) 243 10/5/2018  - <200   HDL (mg/dL) ((L)) 34 1/8/2020 ((L)) 28 10/5/2018 >40 -    LDL Direct (mg/dL) ((H)) 182 1/8/2020 ((H)) 141 10/5/2018  - <100   Triglyceride (mg/dL) ((H)) 465 1/8/2020 ((H)) 590 10/5/2018  - <150       Please contact me or my assistant at [phone] if you have any questions.     Sincerely,        Akin Castillo MD        What do your labs mean?  Below is a glossary of commonly ordered labs:  LDL   Bad Cholesterol   HDL   Good Cholesterol  AST/ALT   Liver Function   Cr/Creatinine   Kidney Function  Microalbumin   Kidney Function  BUN   Kidney Function  PSA   Prostate    TSH   Thyroid Hormone  HgbA1c   Diabetes Test   Hgb (Hemoglobin)   Red Blood Cells

## 2022-02-16 NOTE — TELEPHONE ENCOUNTER
Orders, demographics, and notes faxed to [facility], they will contact patient to schedule.

## 2022-02-16 NOTE — NURSING NOTE
Comprehensive Intake Entered On:  10/20/2020 4:07 PM CDT    Performed On:  10/20/2020 4:01 PM CDT by Yazmin CURRAN, Yas               Summary   Chief Complaint :   annual px, refill meds.   Advance Directive :   No   Weight Measured :   235.6 lb(Converted to: 235 lb 10 oz, 106.866 kg)    Height Measured :   76 in(Converted to: 6 ft 4 in, 193.04 cm)    Body Mass Index :   28.68 kg/m2 (HI)    Body Surface Area :   2.39 m2   Systolic Blood Pressure :   138 mmHg (HI)    Diastolic Blood Pressure :   82 mmHg (HI)    Mean Arterial Pressure :   101 mmHg   Peripheral Pulse Rate :   67 bpm   BP Site :   Left arm   Pulse Site :   Radial artery   BP Method :   Manual   HR Method :   Manual   Temperature Tympanic :   97.4 DegF(Converted to: 36.3 DegC)  (LOW)    Oxygen Saturation :   97 %   Yas Arce MA - 10/20/2020 4:01 PM CDT   Health Status   Allergies Verified? :   Yes   Medication History Verified? :   Yes   Medical History Verified? :   Yes   Pre-Visit Planning Status :   Completed   Tobacco Use? :   Former smoker   Yas Arce MA - 10/20/2020 4:01 PM CDT   Consents   Consent for Immunization Exchange :   Consent Granted   Consent for Immunizations to Providers :   Consent Granted   Yas Arce MA - 10/20/2020 4:01 PM CDT   Meds / Allergies   (As Of: 10/20/2020 4:07:51 PM CDT)   Allergies (Active)   No Known Medication Allergies  Estimated Onset Date:   Unspecified ; Created By:   Fei Hicks CMA; Reaction Status:   Active ; Category:   Drug ; Substance:   No Known Medication Allergies ; Type:   Allergy ; Updated By:   Fei Hicks CMA; Reviewed Date:   11/6/2018 6:53 PM CST        Medication List   (As Of: 10/20/2020 4:07:51 PM CDT)   Prescription/Discharge Order    ammonium lactate topical  :   ammonium lactate topical ; Status:   Prescribed ; Ordered As Mnemonic:   Lac-Hydrin 12% topical cream ; Simple Display Line:   1 liu, Topical, bid, 140 gm, 11 Refill(s) ; Ordering Provider:   Anna HALL,  Akni; Catalog Code:   ammonium lactate topical ; Order Dt/Tm:   1/8/2020 4:40:50 PM CST          betamethasone topical  :   betamethasone topical ; Status:   Prescribed ; Ordered As Mnemonic:   betamethasone valerate 0.1% topical cream ; Simple Display Line:   1 liu, Topical, daily, 45 gm, 11 Refill(s) ; Ordering Provider:   Akin Castillo MD; Catalog Code:   betamethasone topical ; Order Dt/Tm:   1/8/2020 4:36:08 PM CST          calcipotriene topical  :   calcipotriene topical ; Status:   Prescribed ; Ordered As Mnemonic:   calcipotriene 0.005% topical cream ; Simple Display Line:   1 liu, Topical, hs, 100 gm, 11 Refill(s) ; Ordering Provider:   Akin Castillo MD; Catalog Code:   calcipotriene topical ; Order Dt/Tm:   1/8/2020 4:36:08 PM CST          metoprolol  :   metoprolol ; Status:   Prescribed ; Ordered As Mnemonic:   Metoprolol Tartrate 25 mg oral tablet ; Simple Display Line:   1 tab(s), Oral, bid, 180 tab(s), 3 Refill(s) ; Ordering Provider:   Akin Castillo MD; Catalog Code:   metoprolol ; Order Dt/Tm:   1/8/2020 4:42:00 PM CST            Home Meds    apremilast  :   apremilast ; Status:   Documented ; Ordered As Mnemonic:   Otezla 30 mg oral tablet ; Simple Display Line:   30 mg, 1 tab(s), Oral, bid, 0 Refill(s) ; Catalog Code:   apremilast ; Order Dt/Tm:   10/20/2020 4:05:13 PM CDT          aspirin  :   aspirin ; Status:   Documented ; Ordered As Mnemonic:   aspirin 81 mg oral delayed release tablet ; Simple Display Line:   81 mg, 1 tab(s), Oral, daily, 0 Refill(s) ; Catalog Code:   aspirin ; Order Dt/Tm:   10/20/2020 4:05:27 PM CDT          cetirizine  :   cetirizine ; Status:   Documented ; Ordered As Mnemonic:   ZyrTEC 10 mg oral tablet ; Simple Display Line:   10 mg, 1 tab(s), Oral, daily, 0 Refill(s) ; Catalog Code:   cetirizine ; Order Dt/Tm:   10/20/2020 4:05:37 PM CDT            ID Risk Screen   Recent Travel History :   No recent travel   Family Member Travel History :   No  recent travel   Other Exposure to Infectious Disease :   Unknown   Yas Arce MA - 10/20/2020 4:01 PM CDT   Social History   Social History   (As Of: 10/20/2020 4:07:51 PM CDT)   Alcohol:        Current, 1-2 times per week, 3 drinks/episode average.   (Last Updated: 5/12/2014 11:21:04 AM CDT by Dominick Levi CMA)          Tobacco:        Past, Cigarettes, 20 year(s).   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   (Last Updated: 1/8/2020 4:39:30 PM CST by Akin Castillo MD)          Home/Environment:        Marital status: .  Spouse/Partner name: Spouse:   Xiomara.  Lives with Spouse.   (Last Updated: 1/4/2012 4:50:36 PM CST by Yas Arce MA)          Exercise:        Exercise frequency: 3-4 times/week.  Exercise type: Bicycling, Running, Walking.   (Last Updated: 6/13/2016 10:30:27 AM CDT by Catie Vilchis CMA)          Sexual:        Sexually active: Yes.  Sexual orientation: Heterosexual.  Other contraceptive use: Hx of Vasectomy.   (Last Updated: 4/16/2015 11:22:54 AM CDT by Dominick Levi CMA)

## 2022-11-15 DIAGNOSIS — E78.2 MIXED HYPERLIPIDEMIA: Primary | ICD-10-CM

## 2022-11-17 NOTE — TELEPHONE ENCOUNTER
Routing refill request to provider for review/approval because:  LOV 10/28/2021    Statins Protocol Failed     LDL on file in past 12 months    Recent (12 mo) or future (30 days) visit within the authorizing provider's specialty    Medication is active on med list        LDL Cholesterol Calculated   Date Value Ref Range Status   10/28/2021 68  Final     Comment:     Reference range: <100     Desirable range <100 mg/dL for primary prevention;    <70 mg/dL for patients with CHD or diabetic patients   with > or = 2 CHD risk factors.     LDL-C is now calculated using the Abhinav-Davis   calculation, which is a validated novel method providing   better accuracy than the Friedewald equation in the   estimation of LDL-C.   Abhinav SS et al. FRANKLIN. 2013;310(25): 5680-3593   (http://education.CarePayment/faq/BCT268)  Lab test performed by:  Lab Mnemonic:[facility]  [address] [place] [address]  Richard Beckett M.D.  Unit of Measure:   mg/dL (calc)  [facility] Specimen received date and time: 29-OCT-2021 02:50:00.00       LDL Cholesterol Direct   Date Value Ref Range Status   10/05/2018 141 (H) <100 mg/dL Final     Comment:        Desirable range <100 mg/dL for primary prevention;    <70 mg/dL for patients with CHD or diabetic patients   with > or = 2 CHD risk factors.     FASTING:YES  FASTING: YES  Lab test performed by:  Lab Mnemonic:[facility]  [address] [place] [address]  Richard Beckett M.D.  [facility] Specimen received date and time: 05-OCT-2018 08:30:00.00        RAGHU Harden [facility]

## 2022-11-18 RX ORDER — ATORVASTATIN CALCIUM 20 MG/1
20 TABLET, FILM COATED ORAL DAILY
Qty: 90 TABLET | Refills: 0 | Status: SHIPPED | OUTPATIENT
Start: 2022-11-18 | End: 2022-12-13

## 2022-12-12 PROBLEM — L40.3 PUSTULAR PSORIASIS OF PALMS AND SOLES: Status: ACTIVE | Noted: 2022-12-12

## 2022-12-12 PROBLEM — R73.03 PREDIABETES: Status: ACTIVE | Noted: 2022-12-12

## 2022-12-12 PROBLEM — E78.2 MIXED HYPERLIPIDEMIA: Status: ACTIVE | Noted: 2022-12-12

## 2022-12-12 PROBLEM — G43.909 MIGRAINE HEADACHE: Status: ACTIVE | Noted: 2022-12-12

## 2022-12-12 PROBLEM — K22.0 ACHALASIA OF ESOPHAGUS: Status: ACTIVE | Noted: 2022-12-12

## 2022-12-12 PROBLEM — D36.9 TUBULAR ADENOMA: Status: ACTIVE | Noted: 2022-12-12

## 2022-12-12 PROBLEM — E66.9 OBESITY: Status: ACTIVE | Noted: 2022-12-12

## 2022-12-12 PROBLEM — I10 BENIGN ESSENTIAL HYPERTENSION: Status: ACTIVE | Noted: 2022-12-12

## 2022-12-12 PROBLEM — L40.9 PSORIASIS: Status: ACTIVE | Noted: 2022-12-12

## 2022-12-12 RX ORDER — CETIRIZINE HYDROCHLORIDE 10 MG/1
10 TABLET ORAL DAILY
COMMUNITY
Start: 2020-10-20 | End: 2023-06-26

## 2022-12-12 RX ORDER — APREMILAST 30 MG/1
30 TABLET, FILM COATED ORAL 2 TIMES DAILY
COMMUNITY
Start: 2020-10-20 | End: 2023-06-26

## 2022-12-13 ENCOUNTER — OFFICE VISIT (OUTPATIENT)
Dept: FAMILY MEDICINE | Facility: CLINIC | Age: 52
End: 2022-12-13
Payer: COMMERCIAL

## 2022-12-13 VITALS
HEIGHT: 76 IN | WEIGHT: 232.2 LBS | HEART RATE: 79 BPM | BODY MASS INDEX: 28.27 KG/M2 | TEMPERATURE: 98.8 F | SYSTOLIC BLOOD PRESSURE: 132 MMHG | DIASTOLIC BLOOD PRESSURE: 79 MMHG

## 2022-12-13 DIAGNOSIS — R73.03 PREDIABETES: ICD-10-CM

## 2022-12-13 DIAGNOSIS — E78.2 MIXED HYPERLIPIDEMIA: ICD-10-CM

## 2022-12-13 DIAGNOSIS — Z00.00 ANNUAL PHYSICAL EXAM: Primary | ICD-10-CM

## 2022-12-13 DIAGNOSIS — I10 BENIGN ESSENTIAL HYPERTENSION: ICD-10-CM

## 2022-12-13 LAB — HBA1C MFR BLD: 5.9 % (ref 0–5.6)

## 2022-12-13 PROCEDURE — 36415 COLL VENOUS BLD VENIPUNCTURE: CPT | Performed by: FAMILY MEDICINE

## 2022-12-13 PROCEDURE — 80048 BASIC METABOLIC PNL TOTAL CA: CPT | Performed by: FAMILY MEDICINE

## 2022-12-13 PROCEDURE — 99214 OFFICE O/P EST MOD 30 MIN: CPT | Mod: 25 | Performed by: FAMILY MEDICINE

## 2022-12-13 PROCEDURE — 83036 HEMOGLOBIN GLYCOSYLATED A1C: CPT | Performed by: FAMILY MEDICINE

## 2022-12-13 PROCEDURE — 80061 LIPID PANEL: CPT | Performed by: FAMILY MEDICINE

## 2022-12-13 PROCEDURE — 99396 PREV VISIT EST AGE 40-64: CPT | Performed by: FAMILY MEDICINE

## 2022-12-13 RX ORDER — ATORVASTATIN CALCIUM 20 MG/1
20 TABLET, FILM COATED ORAL DAILY
Qty: 90 TABLET | Refills: 3 | Status: SHIPPED | OUTPATIENT
Start: 2022-12-13 | End: 2024-01-02

## 2022-12-13 ASSESSMENT — ENCOUNTER SYMPTOMS
PALPITATIONS: 0
HEMATURIA: 0
ABDOMINAL PAIN: 0
HEARTBURN: 0
CONSTIPATION: 0
DYSURIA: 0
FREQUENCY: 0
HEADACHES: 0
MYALGIAS: 0
EYE PAIN: 0
COUGH: 0
SHORTNESS OF BREATH: 0
NERVOUS/ANXIOUS: 0
SORE THROAT: 0
JOINT SWELLING: 0
WEAKNESS: 0
DIARRHEA: 0
CHILLS: 0
DIZZINESS: 0
HEMATOCHEZIA: 0
PARESTHESIAS: 0
ARTHRALGIAS: 1
FEVER: 0
NAUSEA: 0

## 2022-12-13 NOTE — PROGRESS NOTES
SUBJECTIVE:   CC: Santiago is an 52 year old who presents for preventative health visit.     Patient has been advised of split billing requirements and indicates understanding: Yes     Healthy Habits:     Getting at least 3 servings of Calcium per day:  Yes    Bi-annual eye exam:  NO    Dental care twice a year:  Yes    Sleep apnea or symptoms of sleep apnea:  Daytime drowsiness    Diet:  Vegetarian/vegan    Frequency of exercise:  4-5 days/week    Duration of exercise:  Greater than 60 minutes    Taking medications regularly:  Yes    Medication side effects:  None    PHQ-2 Total Score: 0    Additional concerns today:  No    Hand eczema has been under control with Otezla.  No side effects medication.  He is tolerating moderate dose atorvastatin.  He has had significant improvement in his triglycerides.  He reports dietary change in the last year.  Blood pressure has been controlled with dietary measures and activity.  Today's PHQ-2 Score:   PHQ-2 ( 1999 [employer]) 12/13/2022   Q1: Little interest or pleasure in doing things 0   Q2: Feeling down, depressed or hopeless 0   PHQ-2 Score 0   Q1: Little interest or pleasure in doing things Not at all   Q2: Feeling down, depressed or hopeless Not at all   PHQ-2 Score 0       Have you ever done Advance Care Planning? (For example, a Health Directive, POLST, or a discussion with a medical provider or your loved ones about your wishes): No, advance care planning information given to patient to review.  Patient declined advance care planning discussion at this time.    Social History     Tobacco Use     Smoking status: Former     Types: Cigarettes     Smokeless tobacco: Never   Substance Use Topics     Alcohol use: Not Currently     If you drink alcohol do you typically have >3 drinks per day or >7 drinks per week? No    Alcohol Use 12/13/2022   Prescreen: >3 drinks/day or >7 drinks/week? Not Applicable   No flowsheet data found.    Last PSA: No results found for: PSA    Reviewed  "orders with patient. Reviewed health maintenance and updated orders accordingly - Yes  Lab work is in process  Labs reviewed in EPIC    Reviewed and updated as needed this visit by clinical staff   Tobacco   Meds              Reviewed and updated as needed this visit by Provider                 No past medical history on file.   Past Surgical History:   Procedure Laterality Date     COLONOSCOPY  11/17/2020    11mm tubular adenoma; 10mm hyperplastic polyp; repeat in 3 yrs     COLONOSCOPY  02/25/2020    Tubular adenoma x3 ranging in size from 3mm-15mm; repeat in 6-12 months     HELLER MYOTOMY  1986    With Zaki     SURGICAL PATHOLOGY EXAM  09/25/2019    Single wedge excision of lung--benign lesion     UPPER GI ENDOSCOPY,BIOPSY  12/15/2009    With balloon dilation of esophagus and distal esophageal biopsies     VASECTOMY  12/17/2009       Review of Systems   Constitutional: Negative for chills and fever.   HENT: Negative for congestion, ear pain, hearing loss and sore throat.    Eyes: Negative for pain and visual disturbance.   Respiratory: Negative for cough and shortness of breath.    Cardiovascular: Negative for chest pain, palpitations and peripheral edema.   Gastrointestinal: Negative for abdominal pain, constipation, diarrhea, heartburn, hematochezia and nausea.   Genitourinary: Negative for dysuria, frequency, genital sores, hematuria, impotence, penile discharge and urgency.   Musculoskeletal: Positive for arthralgias. Negative for joint swelling and myalgias.   Skin: Negative for rash.   Neurological: Negative for dizziness, weakness, headaches and paresthesias.   Psychiatric/Behavioral: Negative for mood changes. The patient is not nervous/anxious.          OBJECTIVE:   /79 (BP Location: Right arm, Patient Position: Sitting, Cuff Size: Adult Large)   Pulse 79   Temp 98.8  F (37.1  C) (Tympanic)   Ht 1.93 m (6' 4\")   Wt 105.3 kg (232 lb 3.2 oz)   BMI 28.26 kg/m      Physical Exam  GENERAL: " healthy, alert and no distress  EYES: Eyes grossly normal to inspection, PERRL and conjunctivae and sclerae normal  HENT: ear canals and TM's normal, nose and mouth without ulcers or lesions  NECK: no adenopathy, no asymmetry, masses, or scars and thyroid normal to palpation  RESP: lungs clear to auscultation - no rales, rhonchi or wheezes  CV: regular rate and rhythm, normal S1 S2, no S3 or S4, no murmur, click or rub, no peripheral edema and peripheral pulses strong  ABDOMEN: soft, nontender, no hepatosplenomegaly, no masses and bowel sounds normal  MS: no gross musculoskeletal defects noted, no edema  SKIN: no suspicious lesions or rashes  NEURO: Normal strength and tone, mentation intact and speech normal  PSYCH: mentation appears normal, affect normal/bright    Diagnostic Test Results:  Labs reviewed in Epic    ASSESSMENT/PLAN:   Isai was seen today for physical.    Diagnoses and all orders for this visit:    Annual physical exam    Mixed hyperlipidemia  Controlled, continue current medication  -     Lipid panel reflex to direct LDL Non-fasting  -     atorvastatin (LIPITOR) 20 MG tablet; Take 1 tablet (20 mg) by mouth daily    Benign essential hypertension  Controlled with dietary measures and activity  -     BASIC METABOLIC PANEL    Prediabetes  History of mild elevation in hemoglobin A1c, recheck today  -     Hemoglobin A1c    Other orders  -     REVIEW OF HEALTH MAINTENANCE PROTOCOL ORDERS        Patient has been advised of split billing requirements and indicates understanding: Yes      COUNSELING:   Reviewed preventive health counseling, as reflected in patient instructions       Regular exercise       Healthy diet/nutrition       Vision screening       Hearing screening       Colorectal cancer screening       Prostate cancer screening        He reports that he has quit smoking. His smoking use included cigarettes. He has never used smokeless tobacco.            Zoran Quintana MD  [facility]  CLINICS - [place]

## 2022-12-13 NOTE — LETTER
December 14, 2022      Santiago Olson  [address] [facility] [address]        Dear ,    We are writing to inform you of your test results.    Your test results fall within the expected range(s) or remain unchanged from previous results.  Please continue with current treatment plan.    Resulted Orders   Lipid panel reflex to direct LDL Non-fasting   Result Value Ref Range    Cholesterol 143 <200 mg/dL    Triglycerides 170 (H) <150 mg/dL    Direct Measure HDL 38 (L) >=40 mg/dL    LDL Cholesterol Calculated 71 <=100 mg/dL    Non HDL Cholesterol 105 <130 mg/dL    Narrative    Cholesterol  Desirable:  <200 mg/dL    Triglycerides  Normal:  Less than 150 mg/dL  Borderline High:  150-199 mg/dL  High:  200-499 mg/dL  Very High:  Greater than or equal to 500 mg/dL    Direct Measure HDL  Female:  Greater than or equal to 50 mg/dL   Male:  Greater than or equal to 40 mg/dL    LDL Cholesterol  Desirable:  <100mg/dL  Above Desirable:  100-129 mg/dL   Borderline High:  130-159 mg/dL   High:  160-189 mg/dL   Very High:  >= 190 mg/dL    Non HDL Cholesterol  Desirable:  130 mg/dL  Above Desirable:  130-159 mg/dL  Borderline High:  160-189 mg/dL  High:  190-219 mg/dL  Very High:  Greater than or equal to 220 mg/dL   BASIC METABOLIC PANEL   Result Value Ref Range    Sodium 136 136 - 145 mmol/L    Potassium 4.1 3.4 - 5.3 mmol/L    Chloride 101 98 - 107 mmol/L    Carbon Dioxide (CO2) 23 22 - 29 mmol/L    Anion Gap 12 7 - 15 mmol/L    Urea Nitrogen 16.9 6.0 - 20.0 mg/dL    Creatinine 0.94 0.67 - 1.17 mg/dL    Calcium 10.3 (H) 8.6 - 10.0 mg/dL    Glucose 101 (H) 70 - 99 mg/dL    GFR Estimate >90 >60 mL/min/1.73m2      Comment:      Effective December 21, 2021 eGFRcr in adults is calculated using the 2021 CKD-EPI creatinine equation which includes age and gender (Chantel pacheco al., NEJM, DOI: 10.1056/ODGZqf6321354)   Hemoglobin A1c   Result Value Ref Range    Hemoglobin A1C 5.9 (H) 0.0 - 5.6 %      Comment:      Normal <5.7%    Prediabetes 5.7-6.4%    Diabetes 6.5% or higher     Note: Adopted from ADA consensus guidelines.       If you have any questions or concerns, please call the clinic at the number listed above.       Sincerely,      Zoran Quintana MD

## 2022-12-14 LAB
ANION GAP SERPL CALCULATED.3IONS-SCNC: 12 MMOL/L (ref 7–15)
BUN SERPL-MCNC: 16.9 MG/DL (ref 6–20)
CALCIUM SERPL-MCNC: 10.3 MG/DL (ref 8.6–10)
CHLORIDE SERPL-SCNC: 101 MMOL/L (ref 98–107)
CHOLEST SERPL-MCNC: 143 MG/DL
CREAT SERPL-MCNC: 0.94 MG/DL (ref 0.67–1.17)
DEPRECATED HCO3 PLAS-SCNC: 23 MMOL/L (ref 22–29)
GFR SERPL CREATININE-BSD FRML MDRD: >90 ML/MIN/1.73M2
GLUCOSE SERPL-MCNC: 101 MG/DL (ref 70–99)
HDLC SERPL-MCNC: 38 MG/DL
LDLC SERPL CALC-MCNC: 71 MG/DL
NONHDLC SERPL-MCNC: 105 MG/DL
POTASSIUM SERPL-SCNC: 4.1 MMOL/L (ref 3.4–5.3)
SODIUM SERPL-SCNC: 136 MMOL/L (ref 136–145)
TRIGL SERPL-MCNC: 170 MG/DL

## 2023-06-26 ENCOUNTER — OFFICE VISIT (OUTPATIENT)
Dept: FAMILY MEDICINE | Facility: CLINIC | Age: 53
End: 2023-06-26
Payer: COMMERCIAL

## 2023-06-26 VITALS
DIASTOLIC BLOOD PRESSURE: 85 MMHG | WEIGHT: 237.7 LBS | OXYGEN SATURATION: 99 % | HEART RATE: 67 BPM | RESPIRATION RATE: 16 BRPM | HEIGHT: 76 IN | BODY MASS INDEX: 28.95 KG/M2 | SYSTOLIC BLOOD PRESSURE: 129 MMHG | TEMPERATURE: 97.6 F

## 2023-06-26 DIAGNOSIS — K12.0 APHTHOUS ULCERATION: Primary | ICD-10-CM

## 2023-06-26 PROCEDURE — 99213 OFFICE O/P EST LOW 20 MIN: CPT | Performed by: FAMILY MEDICINE

## 2023-06-26 NOTE — PROGRESS NOTES
"  Assessment & Plan     Aphthous ulceration  I have advised topical anesthetics as needed, monitor current use of this mouthwash and follow-up if symptoms worsen.  Consider use of topical diphenhydramine swish and swallow as well.               BMI:   Estimated body mass index is 28.93 kg/m  as calculated from the following:    Height as of this encounter: 1.93 m (6' 4\").    Weight as of this encounter: 107.8 kg (237 lb 11.2 oz).           Zoran Quintana MD  [facility]    Karie Henderson is a 52 year old, presenting for the following health issues:  Pharyngitis (C/o sores in mouth around tonsils, roof of mouth.  Is seeing a periodontist)        6/26/2023     6:59 AM   Additional Questions   Roomed by Yas CALLES CMA   Accompanied by Self     History of Present Illness       Reason for visit:  New mouth sores  Symptom onset:  More than a month  Symptoms include:  Transient sores in mouth that last a week  Symptom intensity:  Moderate  Symptom progression:  Staying the same  Had these symptoms before:  No  What makes it worse:  No  What makes it better:  Ibuprofin    He eats 2-3 servings of fruits and vegetables daily.He consumes 0 sweetened beverage(s) daily.He exercises with enough effort to increase his heart rate 30 to 60 minutes per day.  He exercises with enough effort to increase his heart rate 5 days per week.   He is taking medications regularly.               Review of Systems   Constitutional, HEENT, cardiovascular, pulmonary, gi and gu systems are negative, except as otherwise noted.      Objective    /85 (BP Location: Right arm, Patient Position: Sitting, Cuff Size: Adult Large)   Pulse 67   Temp 97.6  F (36.4  C) (Tympanic)   Resp 16   Ht 1.93 m (6' 4\")   Wt 107.8 kg (237 lb 11.2 oz)   SpO2 99%   BMI 28.93 kg/m    Body mass index is 28.93 kg/m .  Physical Exam   Alert, oriented, no acute distress  Oral exam reveals resolving ulcers on the soft palate, teeth in " good repair  No submental or cervical adenopathy  Normal heart rate  Nonlabored breathing

## 2023-12-28 ASSESSMENT — ENCOUNTER SYMPTOMS
HEMATOCHEZIA: 0
HEADACHES: 0
MYALGIAS: 0
NERVOUS/ANXIOUS: 0
HEARTBURN: 0
DIARRHEA: 0
HEMATURIA: 0
CHILLS: 0
PALPITATIONS: 0
COUGH: 0
JOINT SWELLING: 0
ABDOMINAL PAIN: 0
SORE THROAT: 1
FREQUENCY: 0
PARESTHESIAS: 0
NAUSEA: 0
WEAKNESS: 0
DIZZINESS: 0
DYSURIA: 0
SHORTNESS OF BREATH: 0
EYE PAIN: 0
ARTHRALGIAS: 0
FEVER: 0
CONSTIPATION: 0

## 2024-01-02 ENCOUNTER — TRANSFERRED RECORDS (OUTPATIENT)
Dept: MULTI SPECIALTY CLINIC | Facility: CLINIC | Age: 54
End: 2024-01-02

## 2024-01-02 ENCOUNTER — OFFICE VISIT (OUTPATIENT)
Dept: FAMILY MEDICINE | Facility: CLINIC | Age: 54
End: 2024-01-02
Payer: COMMERCIAL

## 2024-01-02 VITALS
HEIGHT: 76 IN | HEART RATE: 66 BPM | SYSTOLIC BLOOD PRESSURE: 121 MMHG | BODY MASS INDEX: 27.92 KG/M2 | RESPIRATION RATE: 16 BRPM | OXYGEN SATURATION: 98 % | WEIGHT: 229.3 LBS | DIASTOLIC BLOOD PRESSURE: 83 MMHG | TEMPERATURE: 97.3 F

## 2024-01-02 DIAGNOSIS — I10 BENIGN ESSENTIAL HYPERTENSION: ICD-10-CM

## 2024-01-02 DIAGNOSIS — Z13.1 SCREENING FOR DIABETES MELLITUS: ICD-10-CM

## 2024-01-02 DIAGNOSIS — K22.0 ACHALASIA OF ESOPHAGUS: ICD-10-CM

## 2024-01-02 DIAGNOSIS — Z12.11 SCREEN FOR COLON CANCER: ICD-10-CM

## 2024-01-02 DIAGNOSIS — Z11.59 ENCOUNTER FOR HCV SCREENING TEST FOR LOW RISK PATIENT: ICD-10-CM

## 2024-01-02 DIAGNOSIS — Z11.4 SCREENING FOR HUMAN IMMUNODEFICIENCY VIRUS: ICD-10-CM

## 2024-01-02 DIAGNOSIS — Z12.5 SCREENING FOR PROSTATE CANCER: ICD-10-CM

## 2024-01-02 DIAGNOSIS — E78.2 MIXED HYPERLIPIDEMIA: ICD-10-CM

## 2024-01-02 DIAGNOSIS — Z00.00 HEALTH MAINTENANCE EXAMINATION: Primary | ICD-10-CM

## 2024-01-02 LAB
ANION GAP SERPL CALCULATED.3IONS-SCNC: 13 MMOL/L (ref 7–15)
BUN SERPL-MCNC: 13.9 MG/DL (ref 6–20)
CALCIUM SERPL-MCNC: 10.2 MG/DL (ref 8.6–10)
CHLORIDE SERPL-SCNC: 102 MMOL/L (ref 98–107)
CHOLEST SERPL-MCNC: 219 MG/DL
CREAT SERPL-MCNC: 1.05 MG/DL (ref 0.67–1.17)
DEPRECATED HCO3 PLAS-SCNC: 25 MMOL/L (ref 22–29)
EGFRCR SERPLBLD CKD-EPI 2021: 84 ML/MIN/1.73M2
FASTING STATUS PATIENT QL REPORTED: ABNORMAL
GLUCOSE SERPL-MCNC: 100 MG/DL (ref 70–99)
HBA1C MFR BLD: 5.9 % (ref 0–5.6)
HCV AB SERPL QL IA: NONREACTIVE
HDLC SERPL-MCNC: 37 MG/DL
HIV 1+2 AB+HIV1 P24 AG SERPL QL IA: NONREACTIVE
LDLC SERPL CALC-MCNC: 146 MG/DL
NONHDLC SERPL-MCNC: 182 MG/DL
POTASSIUM SERPL-SCNC: 4.5 MMOL/L (ref 3.4–5.3)
PSA SERPL DL<=0.01 NG/ML-MCNC: 0.34 NG/ML (ref 0–3.5)
SODIUM SERPL-SCNC: 140 MMOL/L (ref 135–145)
TRIGL SERPL-MCNC: 181 MG/DL

## 2024-01-02 PROCEDURE — 36415 COLL VENOUS BLD VENIPUNCTURE: CPT | Performed by: FAMILY MEDICINE

## 2024-01-02 PROCEDURE — 99213 OFFICE O/P EST LOW 20 MIN: CPT | Mod: 25 | Performed by: FAMILY MEDICINE

## 2024-01-02 PROCEDURE — 80061 LIPID PANEL: CPT | Performed by: FAMILY MEDICINE

## 2024-01-02 PROCEDURE — 80048 BASIC METABOLIC PNL TOTAL CA: CPT | Performed by: FAMILY MEDICINE

## 2024-01-02 PROCEDURE — 86803 HEPATITIS C AB TEST: CPT | Performed by: FAMILY MEDICINE

## 2024-01-02 PROCEDURE — 83036 HEMOGLOBIN GLYCOSYLATED A1C: CPT | Performed by: FAMILY MEDICINE

## 2024-01-02 PROCEDURE — G0103 PSA SCREENING: HCPCS | Performed by: FAMILY MEDICINE

## 2024-01-02 PROCEDURE — 99396 PREV VISIT EST AGE 40-64: CPT | Performed by: FAMILY MEDICINE

## 2024-01-02 PROCEDURE — 87389 HIV-1 AG W/HIV-1&-2 AB AG IA: CPT | Performed by: FAMILY MEDICINE

## 2024-01-02 ASSESSMENT — ENCOUNTER SYMPTOMS
ABDOMINAL PAIN: 0
DYSURIA: 0
PARESTHESIAS: 0
FEVER: 0
ARTHRALGIAS: 0
FREQUENCY: 0
SORE THROAT: 1
PALPITATIONS: 0
DIZZINESS: 0
MYALGIAS: 0
HEMATOCHEZIA: 0
SHORTNESS OF BREATH: 0
HEADACHES: 0
CHILLS: 0
HEARTBURN: 0
CONSTIPATION: 0
NERVOUS/ANXIOUS: 0
EYE PAIN: 0
WEAKNESS: 0
DIARRHEA: 0
COUGH: 0
JOINT SWELLING: 0
HEMATURIA: 0
NAUSEA: 0

## 2024-01-02 NOTE — PROGRESS NOTES
"SUBJECTIVE:   Santiago is a 53 year old, presenting for the following:  Physical (Annual px)        2024    10:08 AM   Additional Questions   Roomed by Yas CALLES CMA   Accompanied by Self       Healthy Habits:     Getting at least 3 servings of Calcium per day:  Yes    Bi-annual eye exam:  NO    Dental care twice a year:  Yes    Sleep apnea or symptoms of sleep apnea:  None    Diet:  Vegetarian/vegan    Frequency of exercise:  4-5 days/week    Duration of exercise:  45-60 minutes    Taking medications regularly:  Yes    Medication side effects:  None    Additional concerns today:  Yes      He has been on a vegetarian diet for the last year and a half and he feels better.  He has stopped his medications due to increased activity and dietary changes.  Overall he is feeling well. He stays active with running and hiking.        Social History     Tobacco Use    Smoking status: Former     Packs/day: 1.00     Years: 25.00     Additional pack years: 0.00     Total pack years: 25.00     Types: Cigarettes     Quit date: 1985     Years since quittin.1     Passive exposure: Past    Smokeless tobacco: Never   Substance Use Topics    Alcohol use: Not Currently             2023     4:50 AM   Alcohol Use   Prescreen: >3 drinks/day or >7 drinks/week? Not Applicable       Last PSA: No results found for: \"PSA\"    Reviewed orders with patient. Reviewed health maintenance and updated orders accordingly - Yes      Reviewed and updated as needed this visit by clinical staff   Tobacco  Allergies  Meds              Reviewed and updated as needed this visit by Provider                     Review of Systems   Constitutional:  Negative for chills and fever.   HENT:  Positive for sore throat. Negative for congestion, ear pain and hearing loss.    Eyes:  Negative for pain and visual disturbance.   Respiratory:  Negative for cough and shortness of breath.    Cardiovascular:  Negative for chest pain, palpitations and peripheral " "edema.   Gastrointestinal:  Negative for abdominal pain, constipation, diarrhea, heartburn, hematochezia and nausea.   Genitourinary:  Negative for dysuria, frequency, genital sores, hematuria, impotence, penile discharge and urgency.   Musculoskeletal:  Negative for arthralgias, joint swelling and myalgias.   Skin:  Negative for rash.   Neurological:  Negative for dizziness, weakness, headaches and paresthesias.   Psychiatric/Behavioral:  Negative for mood changes. The patient is not nervous/anxious.          OBJECTIVE:   /83 (BP Location: Right arm, Patient Position: Sitting, Cuff Size: Adult Large)   Pulse 66   Temp 97.3  F (36.3  C) (Tympanic)   Resp 16   Ht 1.93 m (6' 4\")   Wt 104 kg (229 lb 4.8 oz)   SpO2 98%   BMI 27.91 kg/m      Physical Exam  GENERAL: healthy, alert and no distress  EYES: Eyes grossly normal to inspection, PERRL and conjunctivae and sclerae normal  HENT: hearing aides present, nose and mouth without ulcers or lesions  NECK: no adenopathy, no asymmetry, masses, or scars and thyroid normal to palpation  RESP: lungs clear to auscultation - no rales, rhonchi or wheezes  CV: regular rate and rhythm, normal S1 S2, no S3 or S4, no murmur, click or rub, no peripheral edema and peripheral pulses strong  ABDOMEN: soft, nontender, no hepatosplenomegaly, no masses and bowel sounds normal  MS: no gross musculoskeletal defects noted, no edema  PSYCH: mentation appears normal, affect normal/bright        ASSESSMENT/PLAN:   Santiago was seen today for physical.    Diagnoses and all orders for this visit:    Health maintenance examination    Benign essential hypertension  Blood pressure is currently in the normal range.  He will continue with dietary changes and regular exercise.  -     BASIC METABOLIC PANEL; Future    Mixed hyperlipidemia  Lipids have been elevated previously.  He has been on statins.  Check labs today and determine need for statin.  We have also discussed coronary calcium " "screening.  -     Lipid panel reflex to direct LDL Non-fasting; Future    Achalasia of esophagus  -     Adult GI  Referral - Procedure Only; Future    Screen for colon cancer  -     Colonoscopy Screening  Referral; Future    Screening for diabetes mellitus  -     Hemoglobin A1c; Future    Screening for prostate cancer  -     PSA, screen; Future    Encounter for HCV screening test for low risk patient  -     Hepatitis C Screen Reflex to HCV RNA Quant and Genotype; Future    Screening for human immunodeficiency virus  -     HIV Antigen Antibody Combo; Future    Other orders  -     REVIEW OF HEALTH MAINTENANCE PROTOCOL ORDERS        Patient has been advised of split billing requirements and indicates understanding: Yes      COUNSELING:   Reviewed preventive health counseling, as reflected in patient instructions       Regular exercise       Healthy diet/nutrition       Vision screening       Hearing screening       Aspirin prophylaxis        Consider Hep C screening for all patients one time for ages 18-79 years       Colorectal cancer screening       Prostate cancer screening      BMI:   Estimated body mass index is 27.91 kg/m  as calculated from the following:    Height as of this encounter: 1.93 m (6' 4\").    Weight as of this encounter: 104 kg (229 lb 4.8 oz).         He reports that he quit smoking about 38 years ago. His smoking use included cigarettes. He has a 25 pack-year smoking history. He has been exposed to tobacco smoke. He has never used smokeless tobacco.            Zoran Quintana MD  [facility]  "

## 2024-01-03 DIAGNOSIS — E78.2 MIXED HYPERLIPIDEMIA: ICD-10-CM

## 2024-01-03 RX ORDER — ATORVASTATIN CALCIUM 20 MG/1
20 TABLET, FILM COATED ORAL DAILY
Qty: 90 TABLET | Refills: 3 | Status: SHIPPED | OUTPATIENT
Start: 2024-01-03

## 2025-02-09 SDOH — HEALTH STABILITY: PHYSICAL HEALTH: ON AVERAGE, HOW MANY MINUTES DO YOU ENGAGE IN EXERCISE AT THIS LEVEL?: 60 MIN

## 2025-02-09 SDOH — HEALTH STABILITY: PHYSICAL HEALTH: ON AVERAGE, HOW MANY DAYS PER WEEK DO YOU ENGAGE IN MODERATE TO STRENUOUS EXERCISE (LIKE A BRISK WALK)?: 6 DAYS

## 2025-02-09 ASSESSMENT — SOCIAL DETERMINANTS OF HEALTH (SDOH): HOW OFTEN DO YOU GET TOGETHER WITH FRIENDS OR RELATIVES?: PATIENT DECLINED

## 2025-02-11 ENCOUNTER — OFFICE VISIT (OUTPATIENT)
Dept: FAMILY MEDICINE | Facility: CLINIC | Age: 55
End: 2025-02-11
Payer: COMMERCIAL

## 2025-02-11 VITALS
TEMPERATURE: 97.3 F | SYSTOLIC BLOOD PRESSURE: 120 MMHG | RESPIRATION RATE: 16 BRPM | OXYGEN SATURATION: 100 % | HEART RATE: 59 BPM | BODY MASS INDEX: 26.55 KG/M2 | WEIGHT: 218 LBS | HEIGHT: 76 IN | DIASTOLIC BLOOD PRESSURE: 70 MMHG

## 2025-02-11 DIAGNOSIS — Z00.00 HEALTH MAINTENANCE EXAMINATION: Primary | ICD-10-CM

## 2025-02-11 DIAGNOSIS — I10 BENIGN ESSENTIAL HYPERTENSION: ICD-10-CM

## 2025-02-11 DIAGNOSIS — Z87.898 HISTORY OF PREDIABETES: ICD-10-CM

## 2025-02-11 DIAGNOSIS — Z13.6 SCREENING FOR CARDIOVASCULAR CONDITION: ICD-10-CM

## 2025-02-11 DIAGNOSIS — E78.2 MIXED HYPERLIPIDEMIA: ICD-10-CM

## 2025-02-11 DIAGNOSIS — K22.0 ACHALASIA OF ESOPHAGUS: ICD-10-CM

## 2025-02-11 LAB
EST. AVERAGE GLUCOSE BLD GHB EST-MCNC: 123 MG/DL
HBA1C MFR BLD: 5.9 % (ref 0–5.6)

## 2025-02-11 PROCEDURE — G2211 COMPLEX E/M VISIT ADD ON: HCPCS | Performed by: FAMILY MEDICINE

## 2025-02-11 PROCEDURE — 80061 LIPID PANEL: CPT | Performed by: FAMILY MEDICINE

## 2025-02-11 PROCEDURE — 80048 BASIC METABOLIC PNL TOTAL CA: CPT | Performed by: FAMILY MEDICINE

## 2025-02-11 PROCEDURE — 83036 HEMOGLOBIN GLYCOSYLATED A1C: CPT | Performed by: FAMILY MEDICINE

## 2025-02-11 PROCEDURE — 99396 PREV VISIT EST AGE 40-64: CPT | Performed by: FAMILY MEDICINE

## 2025-02-11 PROCEDURE — 36415 COLL VENOUS BLD VENIPUNCTURE: CPT | Performed by: FAMILY MEDICINE

## 2025-02-11 PROCEDURE — 99213 OFFICE O/P EST LOW 20 MIN: CPT | Mod: 25 | Performed by: FAMILY MEDICINE

## 2025-02-11 NOTE — PROGRESS NOTES
"Preventive Care Visit  [facility]  Zoran Quintana MD, Family Medicine  Feb 11, 2025      Assessment & Plan     Health maintenance examination  We have discussed health maintenance, routine follow-up, immunizations, heart healthy diet, regular activity, weight maintenance.     Benign essential hypertension  Controlled with life style changes, continue to monitor  - BASIC METABOLIC PANEL; Future    Mixed hyperlipidemia  Coronary calcium screening ordered to help stratify risk.  He we will hold off on resuming atorvastatin for now.  Reevaluate once we have the coronary calcium score and his lipid results.  - Lipid panel reflex to direct LDL Non-fasting; Future    Achalasia of esophagus  Stable, no swallowing problems currently    History of prediabetes  Recheck hemoglobin A1c  - Hemoglobin A1c; Future    Screening for cardiovascular condition  We have discussed further stratification of his heart disease risk with coronary calcium scanning.  This will be set up and he will be informed when results are available.  - CT Coronary Calcium Scan; Future            BMI  Estimated body mass index is 26.54 kg/m  as calculated from the following:    Height as of this encounter: 1.93 m (6' 4\").    Weight as of this encounter: 98.9 kg (218 lb).       Counseling  Appropriate preventive services were addressed with this patient via screening, questionnaire, or discussion as appropriate for fall prevention, nutrition, physical activity, Tobacco-use cessation, social engagement, weight loss and cognition.  Checklist reviewing preventive services available has been given to the patient.  Reviewed patient's diet, addressing concerns and/or questions.           Karie Henderson is a 54 year old, presenting for the following:  Physical (Annual px)        2/11/2025     3:34 PM   Additional Questions   Roomed by Yas CALLES CMA   Accompanied by Self        Via the Health Maintenance questionnaire, the patient has " reported the following services have been completed -Colonscopy: [facility] 2024-01-02, this information has been sent to the abstraction team.    HPI  Patient is here for health maintenance and follow-up on chronic disease.  Has been doing well over the last year.  He is currently working as an OR nurse at [employer].  He enjoys being out of management.  He has not been on antihypertensive medication.  His blood pressures at home are in the 120s over 70s.  He has lost some weight and improved his diet.  He has been taking atorvastatin 20 mg daily over the last year.  This has helped with his lipids in the past.  When he does not take that a statin he sees an increase in LDL and triglyceride levels.  He stays active running and hiking.        Health Care Directive  Patient does not have a Health Care Directive: Discussed advance care planning with patient; information given to patient to review.      2/9/2025   General Health   How would you rate your overall physical health? Good   Feel stress (tense, anxious, or unable to sleep) Only a little   (!) STRESS CONCERN      2/9/2025   Nutrition   Three or more servings of calcium each day? Yes   Diet: Vegetarian/vegan    Breakfast skipped   How many servings of fruit and vegetables per day? 4 or more   How many sweetened beverages each day? 0-1       Multiple values from one day are sorted in reverse-chronological order         2/9/2025   Exercise   Days per week of moderate/strenous exercise 6 days   Average minutes spent exercising at this level 60 min         2/9/2025   Social Factors   Frequency of gathering with friends or relatives Patient declined   Worry food won't last until get money to buy more No   Food not last or not have enough money for food? No   Do you have housing? (Housing is defined as stable permanent housing and does not include staying ouside in a car, in a tent, in an abandoned building, in an overnight shelter, or couch-surfing.) Yes   Are  you worried about losing your housing? No   Lack of transportation? No   Unable to get utilities (heat,electricity)? Yes   Want help with housing or utility concern? No   (!) FINANCIAL RESOURCE STRAIN CONCERN      2025   Fall Risk   Fallen 2 or more times in the past year? No   Trouble with walking or balance? No          2025   Dental   Dentist two times every year? Yes            Today's PHQ-2 Score:       2025     3:31 PM   PHQ-2 (  [employer])   Q1: Little interest or pleasure in doing things 0   Q2: Feeling down, depressed or hopeless 0   PHQ-2 Score 0    Q1: Little interest or pleasure in doing things Not at all   Q2: Feeling down, depressed or hopeless Not at all   PHQ-2 Score 0       Patient-reported           2025   Substance Use   Alcohol more than 3/day or more than 7/wk Not Applicable   Do you use any other substances recreationally? No     Social History     Tobacco Use    Smoking status: Former     Types: Cigarettes     Start date: 2021     Quit date: 1985     Years since quittin.2     Passive exposure: Past    Smokeless tobacco: Never   Vaping Use    Vaping status: Never Used   Substance Use Topics    Alcohol use: Not Currently    Drug use: Never           2025   STI Screening   New sexual partner(s) since last STI/HIV test? No   Last PSA:   Prostate Specific Antigen Screen   Date Value Ref Range Status   2024 0.34 0.00 - 3.50 ng/mL Final     ASCVD Risk   The 10-year ASCVD risk score (Karly NARVAEZ, et al., 2019) is: 10.2%    Values used to calculate the score:      Age: 54 years      Sex: Male      Is Non- : No      Diabetic: No      Tobacco smoker: No      Systolic Blood Pressure: 147 mmHg      Is BP treated: No      HDL Cholesterol: 37 mg/dL      Total Cholesterol: 219 mg/dL           Reviewed and updated as needed this visit by Provider                    Past Medical History:   Diagnosis Date    Hypertension      Past  Surgical History:   Procedure Laterality Date    ABDOMEN SURGERY  2010    COLONOSCOPY  11/17/2020    11mm tubular adenoma; 10mm hyperplastic polyp; repeat in 3 yrs    COLONOSCOPY  02/25/2020    Tubular adenoma x3 ranging in size from 3mm-15mm; repeat in 6-12 months    EYE SURGERY  2009    GI SURGERY  1987    HELLER MYOTOMY  1986    With Zaki    SURGICAL PATHOLOGY EXAM  09/25/2019    Single wedge excision of lung--benign lesion    UPPER GI ENDOSCOPY,BIOPSY  12/15/2009    With balloon dilation of esophagus and distal esophageal biopsies    VASECTOMY  12/17/2009     BP Readings from Last 3 Encounters:   02/11/25 120/70   01/02/24 121/83   06/26/23 129/85    Wt Readings from Last 3 Encounters:   02/11/25 98.9 kg (218 lb)   01/02/24 104 kg (229 lb 4.8 oz)   06/26/23 107.8 kg (237 lb 11.2 oz)                  Recent Labs   Lab Test 01/02/24  1116 12/13/22  1641 10/28/21  1500   A1C 5.9* 5.9* 5.9*   * 71 68   HDL 37* 38* 41   TRIG 181* 170* 160*   CR 1.05 0.94 1.05   GFRESTIMATED 84 >90 82   POTASSIUM 4.5 4.1 4.3          Review of Systems  CONSTITUTIONAL: NEGATIVE for fever, chills, change in weight  INTEGUMENTARY/SKIN: NEGATIVE for worrisome rashes, moles or lesions  EYES: NEGATIVE for vision changes or irritation  ENT/MOUTH: NEGATIVE for ear, mouth and throat problems  RESP: NEGATIVE for significant cough or SOB  BREAST: NEGATIVE for masses, tenderness or discharge  CV: NEGATIVE for chest pain, palpitations or peripheral edema  GI: NEGATIVE for nausea, abdominal pain, heartburn, or change in bowel habits  : NEGATIVE for frequency, dysuria, or hematuria  MUSCULOSKELETAL: NEGATIVE for significant arthralgias or myalgia  NEURO: NEGATIVE for weakness, dizziness or paresthesias  ENDOCRINE: NEGATIVE for temperature intolerance, skin/hair changes  HEME: NEGATIVE for bleeding problems  PSYCHIATRIC: NEGATIVE for changes in mood or affect     Objective    Exam  BP (!) 147/79 (BP Location: Right arm, Patient Position:  "Sitting, Cuff Size: Adult Large)   Pulse 59   Temp 97.3  F (36.3  C) (Tympanic)   Resp 16   Ht 1.93 m (6' 4\")   Wt 98.9 kg (218 lb)   SpO2 100%   BMI 26.54 kg/m     Estimated body mass index is 26.54 kg/m  as calculated from the following:    Height as of this encounter: 1.93 m (6' 4\").    Weight as of this encounter: 98.9 kg (218 lb).    Physical Exam  GENERAL: alert and no distress  EYES: Eyes grossly normal to inspection, PERRL and conjunctivae and sclerae normal  HENT: ear canals and TM's normal, nose and mouth without ulcers or lesions  NECK: no adenopathy, no asymmetry, masses, or scars  RESP: lungs clear to auscultation - no rales, rhonchi or wheezes  CV: regular rate and rhythm, normal S1 S2, no S3 or S4, no murmur, click or rub, no peripheral edema  ABDOMEN: soft, nontender, no hepatosplenomegaly, no masses and bowel sounds normal  MS: no gross musculoskeletal defects noted, no edema  SKIN: no suspicious lesions or rashes  NEURO: Normal strength and tone, mentation intact and speech normal  PSYCH: mentation appears normal, affect normal/bright        Signed Electronically by: Zoran Quintana MD    "

## 2025-02-12 LAB
ANION GAP SERPL CALCULATED.3IONS-SCNC: 13 MMOL/L (ref 7–15)
BUN SERPL-MCNC: 11 MG/DL (ref 6–20)
CALCIUM SERPL-MCNC: 10.4 MG/DL (ref 8.8–10.4)
CHLORIDE SERPL-SCNC: 103 MMOL/L (ref 98–107)
CHOLEST SERPL-MCNC: 159 MG/DL
CREAT SERPL-MCNC: 0.86 MG/DL (ref 0.67–1.17)
EGFRCR SERPLBLD CKD-EPI 2021: >90 ML/MIN/1.73M2
FASTING STATUS PATIENT QL REPORTED: YES
FASTING STATUS PATIENT QL REPORTED: YES
GLUCOSE SERPL-MCNC: 88 MG/DL (ref 70–99)
HCO3 SERPL-SCNC: 25 MMOL/L (ref 22–29)
HDLC SERPL-MCNC: 47 MG/DL
LDLC SERPL CALC-MCNC: 95 MG/DL
NONHDLC SERPL-MCNC: 112 MG/DL
POTASSIUM SERPL-SCNC: 4.2 MMOL/L (ref 3.4–5.3)
SODIUM SERPL-SCNC: 141 MMOL/L (ref 135–145)
TRIGL SERPL-MCNC: 87 MG/DL

## 2025-03-04 ENCOUNTER — HOSPITAL ENCOUNTER (OUTPATIENT)
Dept: CT IMAGING | Facility: CLINIC | Age: 55
Discharge: HOME | End: 2025-03-04
Attending: FAMILY MEDICINE
Payer: COMMERCIAL

## 2025-03-04 DIAGNOSIS — Z13.6 SCREENING FOR CARDIOVASCULAR CONDITION: ICD-10-CM

## 2025-03-04 LAB
CV CALCIUM SCORE AGATSTON LM: 0
CV CALCIUM SCORING AGATSON LAD: 38
CV CALCIUM SCORING AGATSTON CX: 30
CV CALCIUM SCORING AGATSTON RCA: 21
CV CALCIUM SCORING AGATSTON TOTAL: 89

## 2025-03-04 PROCEDURE — 75571 CT HRT W/O DYE W/CA TEST: CPT | Mod: 26 | Performed by: GENERAL ACUTE CARE HOSPITAL

## 2025-03-04 PROCEDURE — 75571 CT HRT W/O DYE W/CA TEST: CPT

## 2025-03-05 ENCOUNTER — MYC REFILL (OUTPATIENT)
Dept: FAMILY MEDICINE | Facility: CLINIC | Age: 55
End: 2025-03-05
Payer: COMMERCIAL

## 2025-03-05 DIAGNOSIS — E78.2 MIXED HYPERLIPIDEMIA: ICD-10-CM

## 2025-03-05 RX ORDER — ATORVASTATIN CALCIUM 20 MG/1
20 TABLET, FILM COATED ORAL DAILY
Qty: 90 TABLET | Refills: 3 | Status: SHIPPED | OUTPATIENT
Start: 2025-03-05